# Patient Record
Sex: FEMALE | Race: BLACK OR AFRICAN AMERICAN | NOT HISPANIC OR LATINO | URBAN - METROPOLITAN AREA
[De-identification: names, ages, dates, MRNs, and addresses within clinical notes are randomized per-mention and may not be internally consistent; named-entity substitution may affect disease eponyms.]

---

## 2018-10-29 ENCOUNTER — INPATIENT (INPATIENT)
Facility: HOSPITAL | Age: 68
LOS: 6 days | Discharge: HOME HEALTH | DRG: 234 | End: 2018-11-05
Attending: THORACIC SURGERY (CARDIOTHORACIC VASCULAR SURGERY) | Admitting: THORACIC SURGERY (CARDIOTHORACIC VASCULAR SURGERY)
Payer: MEDICAID

## 2018-10-29 VITALS
RESPIRATION RATE: 17 BRPM | HEART RATE: 64 BPM | DIASTOLIC BLOOD PRESSURE: 73 MMHG | SYSTOLIC BLOOD PRESSURE: 155 MMHG | OXYGEN SATURATION: 100 % | WEIGHT: 132.72 LBS | TEMPERATURE: 99 F

## 2018-10-29 DIAGNOSIS — R07.9 CHEST PAIN, UNSPECIFIED: ICD-10-CM

## 2018-10-29 DIAGNOSIS — Z98.890 OTHER SPECIFIED POSTPROCEDURAL STATES: Chronic | ICD-10-CM

## 2018-10-29 DIAGNOSIS — I10 ESSENTIAL (PRIMARY) HYPERTENSION: ICD-10-CM

## 2018-10-29 DIAGNOSIS — Z98.51 TUBAL LIGATION STATUS: Chronic | ICD-10-CM

## 2018-10-29 DIAGNOSIS — E11.9 TYPE 2 DIABETES MELLITUS WITHOUT COMPLICATIONS: ICD-10-CM

## 2018-10-29 DIAGNOSIS — E78.5 HYPERLIPIDEMIA, UNSPECIFIED: ICD-10-CM

## 2018-10-29 DIAGNOSIS — N18.3 CHRONIC KIDNEY DISEASE, STAGE 3 (MODERATE): ICD-10-CM

## 2018-10-29 LAB
ALBUMIN SERPL ELPH-MCNC: 4.3 G/DL — SIGNIFICANT CHANGE UP (ref 3.3–5)
ALP SERPL-CCNC: 63 U/L — SIGNIFICANT CHANGE UP (ref 40–120)
ALT FLD-CCNC: 20 U/L — SIGNIFICANT CHANGE UP (ref 10–45)
ANION GAP SERPL CALC-SCNC: 17 MMOL/L — SIGNIFICANT CHANGE UP (ref 5–17)
APTT BLD: 27.1 SEC — LOW (ref 27.5–37.4)
AST SERPL-CCNC: 21 U/L — SIGNIFICANT CHANGE UP (ref 10–40)
BASOPHILS NFR BLD AUTO: 0.5 % — SIGNIFICANT CHANGE UP (ref 0–2)
BILIRUB SERPL-MCNC: 0.4 MG/DL — SIGNIFICANT CHANGE UP (ref 0.2–1.2)
BUN SERPL-MCNC: 33 MG/DL — HIGH (ref 7–23)
CALCIUM SERPL-MCNC: 10.2 MG/DL — SIGNIFICANT CHANGE UP (ref 8.4–10.5)
CHLORIDE SERPL-SCNC: 101 MMOL/L — SIGNIFICANT CHANGE UP (ref 96–108)
CK MB CFR SERPL CALC: 1.7 NG/ML — SIGNIFICANT CHANGE UP (ref 0–6.7)
CK SERPL-CCNC: 81 U/L — SIGNIFICANT CHANGE UP (ref 25–170)
CO2 SERPL-SCNC: 21 MMOL/L — LOW (ref 22–31)
CREAT SERPL-MCNC: 1.34 MG/DL — HIGH (ref 0.5–1.3)
EOSINOPHIL NFR BLD AUTO: 3.5 % — SIGNIFICANT CHANGE UP (ref 0–6)
EXTRA SST TUBE: SIGNIFICANT CHANGE UP
GLUCOSE BLDC GLUCOMTR-MCNC: 99 MG/DL — SIGNIFICANT CHANGE UP (ref 70–99)
GLUCOSE SERPL-MCNC: 231 MG/DL — HIGH (ref 70–99)
HCT VFR BLD CALC: 32.7 % — LOW (ref 34.5–45)
HGB BLD-MCNC: 10.7 G/DL — LOW (ref 11.5–15.5)
INR BLD: 1.03 — SIGNIFICANT CHANGE UP (ref 0.88–1.16)
LYMPHOCYTES # BLD AUTO: 36.4 % — SIGNIFICANT CHANGE UP (ref 13–44)
MCHC RBC-ENTMCNC: 26.8 PG — LOW (ref 27–34)
MCHC RBC-ENTMCNC: 32.7 G/DL — SIGNIFICANT CHANGE UP (ref 32–36)
MCV RBC AUTO: 82 FL — SIGNIFICANT CHANGE UP (ref 80–100)
MONOCYTES NFR BLD AUTO: 6.9 % — SIGNIFICANT CHANGE UP (ref 2–14)
NEUTROPHILS NFR BLD AUTO: 52.7 % — SIGNIFICANT CHANGE UP (ref 43–77)
PLATELET # BLD AUTO: 199 K/UL — SIGNIFICANT CHANGE UP (ref 150–400)
POTASSIUM SERPL-MCNC: 4.9 MMOL/L — SIGNIFICANT CHANGE UP (ref 3.5–5.3)
POTASSIUM SERPL-SCNC: 4.9 MMOL/L — SIGNIFICANT CHANGE UP (ref 3.5–5.3)
PROT SERPL-MCNC: 7.7 G/DL — SIGNIFICANT CHANGE UP (ref 6–8.3)
PROTHROM AB SERPL-ACNC: 11.4 SEC — SIGNIFICANT CHANGE UP (ref 9.8–12.7)
RBC # BLD: 3.99 M/UL — SIGNIFICANT CHANGE UP (ref 3.8–5.2)
RBC # FLD: 12.3 % — SIGNIFICANT CHANGE UP (ref 10.3–16.9)
SODIUM SERPL-SCNC: 139 MMOL/L — SIGNIFICANT CHANGE UP (ref 135–145)
TROPONIN T SERPL-MCNC: <0.01 NG/ML — SIGNIFICANT CHANGE UP (ref 0–0.01)
WBC # BLD: 7.7 K/UL — SIGNIFICANT CHANGE UP (ref 3.8–10.5)
WBC # FLD AUTO: 7.7 K/UL — SIGNIFICANT CHANGE UP (ref 3.8–10.5)

## 2018-10-29 PROCEDURE — 99285 EMERGENCY DEPT VISIT HI MDM: CPT | Mod: 25

## 2018-10-29 PROCEDURE — 75574 CT ANGIO HRT W/3D IMAGE: CPT | Mod: 26

## 2018-10-29 PROCEDURE — 71045 X-RAY EXAM CHEST 1 VIEW: CPT | Mod: 26

## 2018-10-29 RX ORDER — ASPIRIN/CALCIUM CARB/MAGNESIUM 324 MG
81 TABLET ORAL DAILY
Qty: 0 | Refills: 0 | Status: DISCONTINUED | OUTPATIENT
Start: 2018-10-29 | End: 2018-11-01

## 2018-10-29 RX ORDER — GLUCAGON INJECTION, SOLUTION 0.5 MG/.1ML
1 INJECTION, SOLUTION SUBCUTANEOUS ONCE
Qty: 0 | Refills: 0 | Status: DISCONTINUED | OUTPATIENT
Start: 2018-10-29 | End: 2018-10-31

## 2018-10-29 RX ORDER — ASPIRIN/CALCIUM CARB/MAGNESIUM 324 MG
325 TABLET ORAL ONCE
Qty: 0 | Refills: 0 | Status: COMPLETED | OUTPATIENT
Start: 2018-10-29 | End: 2018-10-29

## 2018-10-29 RX ORDER — ATORVASTATIN CALCIUM 80 MG/1
20 TABLET, FILM COATED ORAL AT BEDTIME
Qty: 0 | Refills: 0 | Status: DISCONTINUED | OUTPATIENT
Start: 2018-10-29 | End: 2018-10-31

## 2018-10-29 RX ORDER — DEXTROSE 50 % IN WATER 50 %
15 SYRINGE (ML) INTRAVENOUS ONCE
Qty: 0 | Refills: 0 | Status: DISCONTINUED | OUTPATIENT
Start: 2018-10-29 | End: 2018-10-31

## 2018-10-29 RX ORDER — SODIUM CHLORIDE 9 MG/ML
1000 INJECTION, SOLUTION INTRAVENOUS
Qty: 0 | Refills: 0 | Status: DISCONTINUED | OUTPATIENT
Start: 2018-10-29 | End: 2018-11-01

## 2018-10-29 RX ORDER — HEPARIN SODIUM 5000 [USP'U]/ML
5000 INJECTION INTRAVENOUS; SUBCUTANEOUS EVERY 8 HOURS
Qty: 0 | Refills: 0 | Status: DISCONTINUED | OUTPATIENT
Start: 2018-10-29 | End: 2018-10-31

## 2018-10-29 RX ORDER — SODIUM CHLORIDE 9 MG/ML
1000 INJECTION INTRAMUSCULAR; INTRAVENOUS; SUBCUTANEOUS ONCE
Qty: 0 | Refills: 0 | Status: COMPLETED | OUTPATIENT
Start: 2018-10-29 | End: 2018-10-29

## 2018-10-29 RX ORDER — INSULIN LISPRO 100/ML
VIAL (ML) SUBCUTANEOUS
Qty: 0 | Refills: 0 | Status: DISCONTINUED | OUTPATIENT
Start: 2018-10-29 | End: 2018-11-01

## 2018-10-29 RX ORDER — METOPROLOL TARTRATE 50 MG
50 TABLET ORAL ONCE
Qty: 0 | Refills: 0 | Status: COMPLETED | OUTPATIENT
Start: 2018-10-29 | End: 2018-10-29

## 2018-10-29 RX ORDER — METOPROLOL TARTRATE 50 MG
1 TABLET ORAL
Qty: 0 | Refills: 0 | COMMUNITY

## 2018-10-29 RX ORDER — FAMOTIDINE 10 MG/ML
20 INJECTION INTRAVENOUS ONCE
Qty: 0 | Refills: 0 | Status: COMPLETED | OUTPATIENT
Start: 2018-10-29 | End: 2018-10-29

## 2018-10-29 RX ORDER — LIDOCAINE 4 G/100G
10 CREAM TOPICAL ONCE
Qty: 0 | Refills: 0 | Status: COMPLETED | OUTPATIENT
Start: 2018-10-29 | End: 2018-10-29

## 2018-10-29 RX ORDER — DEXTROSE 50 % IN WATER 50 %
25 SYRINGE (ML) INTRAVENOUS ONCE
Qty: 0 | Refills: 0 | Status: DISCONTINUED | OUTPATIENT
Start: 2018-10-29 | End: 2018-10-31

## 2018-10-29 RX ORDER — ONDANSETRON 8 MG/1
4 TABLET, FILM COATED ORAL ONCE
Qty: 0 | Refills: 0 | Status: COMPLETED | OUTPATIENT
Start: 2018-10-29 | End: 2018-10-29

## 2018-10-29 RX ORDER — METOPROLOL TARTRATE 50 MG
25 TABLET ORAL DAILY
Qty: 0 | Refills: 0 | Status: DISCONTINUED | OUTPATIENT
Start: 2018-10-29 | End: 2018-10-31

## 2018-10-29 RX ORDER — DEXTROSE 50 % IN WATER 50 %
12.5 SYRINGE (ML) INTRAVENOUS ONCE
Qty: 0 | Refills: 0 | Status: DISCONTINUED | OUTPATIENT
Start: 2018-10-29 | End: 2018-10-31

## 2018-10-29 RX ADMIN — ONDANSETRON 104 MILLIGRAM(S): 8 TABLET, FILM COATED ORAL at 14:11

## 2018-10-29 RX ADMIN — Medication 325 MILLIGRAM(S): at 13:43

## 2018-10-29 RX ADMIN — Medication 30 MILLILITER(S): at 14:07

## 2018-10-29 RX ADMIN — ATORVASTATIN CALCIUM 20 MILLIGRAM(S): 80 TABLET, FILM COATED ORAL at 23:26

## 2018-10-29 RX ADMIN — SODIUM CHLORIDE 2000 MILLILITER(S): 9 INJECTION INTRAMUSCULAR; INTRAVENOUS; SUBCUTANEOUS at 15:56

## 2018-10-29 RX ADMIN — FAMOTIDINE 20 MILLIGRAM(S): 10 INJECTION INTRAVENOUS at 14:11

## 2018-10-29 RX ADMIN — LIDOCAINE 10 MILLILITER(S): 4 CREAM TOPICAL at 14:10

## 2018-10-29 RX ADMIN — Medication 50 MILLIGRAM(S): at 14:13

## 2018-10-29 RX ADMIN — HEPARIN SODIUM 5000 UNIT(S): 5000 INJECTION INTRAVENOUS; SUBCUTANEOUS at 23:26

## 2018-10-29 RX ADMIN — SODIUM CHLORIDE 2000 MILLILITER(S): 9 INJECTION INTRAMUSCULAR; INTRAVENOUS; SUBCUTANEOUS at 14:13

## 2018-10-29 NOTE — H&P ADULT - NSHPLABSRESULTS_GEN_ALL_CORE
EKG: no ischemic changes                           10.7   7.7   )-----------( 199      ( 29 Oct 2018 12:45 )             32.7       10-29    139  |  101  |  33<H>  ----------------------------<  231<H>  4.9   |  21<L>  |  1.34<H>    Ca    10.2      29 Oct 2018 12:45    TPro  7.7  /  Alb  4.3  /  TBili  0.4  /  DBili  x   /  AST  21  /  ALT  20  /  AlkPhos  63  10-29      PT/INR - ( 29 Oct 2018 12:45 )   PT: 11.4 sec;   INR: 1.03          PTT - ( 29 Oct 2018 12:45 )  PTT:27.1 sec    CARDIAC MARKERS ( 29 Oct 2018 12:45 )  x     / 0.02 ng/mL / 86 U/L / x     / 1.8 ng/mL

## 2018-10-29 NOTE — H&P ADULT - HISTORY OF PRESENT ILLNESS
Patient is a 69 y/o female with PMHx of HTN, HLD, DM II, CKD stage 3 (unknown baseline Cr) who presented to [facility] ED on 10/29/2018 c/o     Pt denies CP, SOB, palpitations, syncope, PND/orthopnea or LE edema    In ED VS /73, HR 64, RR 17, O2 100%, Temp 98.9. EKG no ischemic changes. CXR unremarkable Labs Trop x1 0.02, Cr. 1.3 CTA coronaries Ca score moderately elevated at 366, severe stenosis due to calcific and noncalcific plaque pLAD, D1, mLCx, mRCA and dRCA. In ED pt given IVF NS bolus x2, Lidocaine 2% 10mL POx1, Lopressor 50mg POx1, IV Zofran 4mg x1, Pepcid 20mg POx1, ASA 325mg POx1 and Maalox 30mL POx1    Upon examination pt currently CP free    Pt is now admitted to [facility] to r/o ACS Patient is a 69 y/o female (visiting from [facility]) with PMHx of HTN, HLD, DM II, CKD stage 3 (unknown baseline Cr) who presented to [facility] ED on 10/29/2018 c/o stabbing chest pain (8/10 severity) that radiates to left shoulder that started last night which is associated with SOB and nausea. Patient states she has had intermittent episodes of chest pain for the past 6 months, but this episode was much worse than what she had experienced in previous episodes    Pt denies CP, SOB, palpitations, syncope, PND/orthopnea or LE edema    In ED VS /73, HR 64, RR 17, O2 100%, Temp 98.9. EKG no ischemic changes. CXR unremarkable Labs Trop x1 0.02, Cr. 1.3 CTA coronaries Ca score moderately elevated at 366, severe stenosis due to calcific and noncalcific plaque pLAD, D1, mLCx, mRCA and dRCA. In ED pt given IVF NS bolus x2, Lidocaine 2% 10mL POx1, Lopressor 50mg POx1, IV Zofran 4mg x1, Pepcid 20mg POx1, ASA 325mg POx1 and Maalox 30mL POx1    Upon examination pt currently CP free    Pt is now admitted to [facility] to r/o ACS Patient is a 67 y/o female (visiting from [facility]) with FHx of CAD PMHx of hx of silent MI (medically managed), HTN, HLD, DM II, CKD stage 3 (unknown baseline Cr) who presented to [facility] ED on 10/29/2018 c/o stabbing chest pain (8/10 severity) that radiates to left shoulder that started last night which is associated with SOB and nausea. Patient states she has had intermittent episodes of chest pain for the past 6 months, but this episode was much worse than what she had experienced in previous episodes. In addition pt reports that for the last year she has experienced JAFFE upon ambulating 5-10 minutes that is subsequently resolved with rest. Pt denies palpitations, syncope, PND/orthopnea or LE edema, recent illness or trauma to chest, fever or chills. In ED VS /73, HR 64, RR 17, O2 100%, Temp 98.9. EKG no ischemic changes. CXR unremarkable Labs Trop x1 0.02, Cr. 1.3 CTA coronaries Ca score moderately elevated at 366, severe stenosis due to calcific and noncalcific plaque pLAD, D1, mLCx, mRCA and dRCA. In ED pt given IVF NS bolus x2, Lidocaine 2% 10mL POx1, Lopressor 50mg POx1, IV Zofran 4mg x1, Pepcid 20mg POx1, ASA 325mg POx1 and Maalox 30mL POx1. Upon examination pt currently CP free.     Pt is now admitted to [facility] for serial Andres, ECHO, and further cardiac workup to r/o ACS Patient is a 69 y/o female (visiting from [facility]) with FHx of CAD PMHx of silent MI (medically managed), HTN, HLD, DM II, CKD stage 3 (unknown baseline Cr) who presented to [facility] ED on 10/29/2018 c/o stabbing chest pain (8/10 severity) that radiates to left shoulder that started last night which is associated with SOB and nausea. Patient states she has had intermittent episodes of chest pain for the past 6 months, but this episode was much worse than what she had experienced in previous episodes. In addition pt reports that for the last year she has experienced JAFFE upon ambulating 5-10 minutes that is subsequently resolved with rest. Pt denies palpitations, syncope, PND/orthopnea or LE edema, recent illness or trauma to chest, fever or chills. In ED VS /73, HR 64, RR 17, O2 100%, Temp 98.9. EKG no ischemic changes. CXR unremarkable Labs Trop x1 0.02, Cr. 1.3 CTA coronaries Ca score moderately elevated at 366, severe stenosis due to calcific and noncalcific plaque pLAD, D1, mLCx, mRCA and dRCA. In ED pt given IVF NS bolus x2, Lidocaine 2% 10mL POx1, Lopressor 50mg POx1, IV Zofran 4mg x1, Pepcid 20mg POx1, ASA 325mg POx1 and Maalox 30mL POx1. Upon examination pt currently CP free.     Pt is now admitted to [facility] for serial Andres, ECHO, and further cardiac workup to r/o ACS

## 2018-10-29 NOTE — H&P ADULT - PROBLEM SELECTOR PLAN 5
-Cr 1.3 on admission, unknown baseline  -hydrate pre-cath in AM  -monitor Cr    DVT ppx:  Dispo: NPO for cath in AM    Case d/w Dr. Tena -Cr 1.3 on admission, unknown baseline  -hydrate pre-cath in AM  -monitor Cr    DVT ppx: SQH  Dispo: NPO for cath in AM    Case d/w Dr. Tena

## 2018-10-29 NOTE — ED PROVIDER NOTE - MEDICAL DECISION MAKING DETAILS
69 y/o female (visiting from [facility], came to the [place] a couple of [place] ago) with PMHx of HTN, HLD, DM II, no known CAD, p/w sharp chest pains radiating to left shoulder and back that started last night and is associated with SOB and nausea. Patient states she has had intermittent episodes of chest pain for the past 6 months. In ED, VS noted and EKG with no ischemic changes. CXR unremarkable. Labs Trop x1 0.02, Cr. 1.3 CTA coronaries Ca score moderately elevated at 366, severe stenosis due to calcific and noncalcific plaque pLAD, D1, mLCx, mRCA and dRCA. Pt given IVF NS bolus x2, Lidocaine 2% 10mL POx1, Lopressor 50mg POx1, IV Zofran 4mg x1, Pepcid 20mg POx1, ASA 325mg POx1 and Maalox 30mL POx1. Case discussed with Dr. Tena and pt admitted to cardiac/ tele sec to sxs and abnormal CTA findings.

## 2018-10-29 NOTE — PROGRESS NOTE ADULT - ASSESSMENT
Chest pain   Monitor  CIE      for echo    Plan    r/o ACS   CTA  Coronaries in AM     Discussed with Dr Eulogio Galloway

## 2018-10-29 NOTE — PROGRESS NOTE ADULT - SUBJECTIVE AND OBJECTIVE BOX
Pt is s/p Hx Diabetes mellitus who present with new onset of Chest pain since last night     PAST MEDICAL & SURGICAL HISTORY:  HTN (hypertension)  DM (diabetes mellitus)    MEDICATIONS  (STANDING):    MEDICATIONS  (PRN):    Home Medications:  Vital Signs Last 24 Hrs  T(C): 37.1 (29 Oct 2018 16:00), Max: 37.2 (29 Oct 2018 12:10)  T(F): 98.7 (29 Oct 2018 16:00), Max: 98.9 (29 Oct 2018 12:10)  HR: 57 (29 Oct 2018 16:00) (57 - 64)  BP: 160/72 (29 Oct 2018 16:00) (155/73 - 160/72)  BP(mean): --  RR: 18 (29 Oct 2018 16:00) (17 - 18)  SpO2: 98% (29 Oct 2018 16:00) (98% - 100%)      Lungs clear  CV s1 s2   abd soft  Ext stable    10-29    139  |  101  |  33<H>  ----------------------------<  231<H>  4.9   |  21<L>  |  1.34<H>    Ca    10.2      29 Oct 2018 12:45    TPro  7.7  /  Alb  4.3  /  TBili  0.4  /  DBili  x   /  AST  21  /  ALT  20  /  AlkPhos  63  10-29                          10.7   7.7   )-----------( 199      ( 29 Oct 2018 12:45 )             32.7       CARDIAC MARKERS ( 29 Oct 2018 12:45 )  x     / 0.02 ng/mL / 86 U/L / x     / 1.8 ng/mL    EKG   NSR  ST depression  I II [id number] T inversion   old AS MI       Chest  C xray Neg     Creatine Kinase, Serum: 86 U/L (10.29.18 @ 12:45)

## 2018-10-29 NOTE — ED PROVIDER NOTE - OBJECTIVE STATEMENT
67 y/o female (visiting from [facility], came to the [place] a couple of [place] ago) with PMHx of HTN, HLD, DM II, no known CAD, p/w sharp chest pains radiating to left shoulder and back that started last night and is associated with SOB and nausea. Patient states she has had intermittent episodes of chest pain for the past 6 months, but this episode was much worse. Pain is not associated with activity. No trauma/ injury. Currently with "little" chest pain. Pt denies syncope, palpitations, PND/orthopnea or LE edema, swelling or pain.

## 2018-10-29 NOTE — H&P ADULT - PROBLEM SELECTOR PLAN 1
-CP free, EKG with no ischemic changes, Trop x1 0.02 f/u trops @8pm and in AM  -CTA coronaries 10/29: Ca score moderately elevated at 366, severe stenosis due to calcific and noncalcific plaque pLAD, D1, mLCx, mRCA and dRCA  -Given ASA 325mg POx1 in ED  -NPO for cardiac cath in AM will need to be put on schedule, consented, and loaded  -ECHO ordered f/u

## 2018-10-29 NOTE — H&P ADULT - ASSESSMENT
Patient is a 67 y/o female with PMHx of HTN, HLD, DM II, CKD stage 3 (unknown baseline Cr) who is admitted to [facility] to r/o ACS

## 2018-10-29 NOTE — ED PROVIDER NOTE - PROGRESS NOTE DETAILS
In ED, VS noted and EKG with no ischemic changes. CXR unremarkable Labs Trop x1 0.02, Cr. 1.3 CTA coronaries Ca score moderately elevated at 366, severe stenosis due to calcific and noncalcific plaque pLAD, D1, mLCx, mRCA and dRCA. Pt given IVF NS bolus x2, Lidocaine 2% 10mL POx1, Lopressor 50mg POx1, IV Zofran 4mg x1, Pepcid 20mg POx1, ASA 325mg POx1 and Maalox 30mL POx1

## 2018-10-29 NOTE — H&P ADULT - PMH
CKD (chronic kidney disease) stage 3, GFR 30-59 ml/min    DM (diabetes mellitus)    HLD (hyperlipidemia)    HTN (hypertension)

## 2018-10-30 LAB
ANION GAP SERPL CALC-SCNC: 12 MMOL/L — SIGNIFICANT CHANGE UP (ref 5–17)
BUN SERPL-MCNC: 20 MG/DL — SIGNIFICANT CHANGE UP (ref 7–23)
CALCIUM SERPL-MCNC: 10.2 MG/DL — SIGNIFICANT CHANGE UP (ref 8.4–10.5)
CHLORIDE SERPL-SCNC: 104 MMOL/L — SIGNIFICANT CHANGE UP (ref 96–108)
CHOLEST SERPL-MCNC: 134 MG/DL — SIGNIFICANT CHANGE UP (ref 10–199)
CK MB CFR SERPL CALC: 2 NG/ML — SIGNIFICANT CHANGE UP (ref 0–6.7)
CK SERPL-CCNC: 76 U/L — SIGNIFICANT CHANGE UP (ref 25–170)
CO2 SERPL-SCNC: 24 MMOL/L — SIGNIFICANT CHANGE UP (ref 22–31)
CREAT SERPL-MCNC: 1.29 MG/DL — SIGNIFICANT CHANGE UP (ref 0.5–1.3)
GLUCOSE BLDC GLUCOMTR-MCNC: 110 MG/DL — HIGH (ref 70–99)
GLUCOSE BLDC GLUCOMTR-MCNC: 214 MG/DL — HIGH (ref 70–99)
GLUCOSE BLDC GLUCOMTR-MCNC: 93 MG/DL — SIGNIFICANT CHANGE UP (ref 70–99)
GLUCOSE BLDC GLUCOMTR-MCNC: 95 MG/DL — SIGNIFICANT CHANGE UP (ref 70–99)
GLUCOSE SERPL-MCNC: 86 MG/DL — SIGNIFICANT CHANGE UP (ref 70–99)
HBA1C BLD-MCNC: 8.3 % — HIGH (ref 4–5.6)
HCT VFR BLD CALC: 29.8 % — LOW (ref 34.5–45)
HDLC SERPL-MCNC: 32 MG/DL — LOW
HGB BLD-MCNC: 9.9 G/DL — LOW (ref 11.5–15.5)
LIPID PNL WITH DIRECT LDL SERPL: 75 MG/DL — SIGNIFICANT CHANGE UP
MAGNESIUM SERPL-MCNC: 1.6 MG/DL — SIGNIFICANT CHANGE UP (ref 1.6–2.6)
MCHC RBC-ENTMCNC: 27.3 PG — SIGNIFICANT CHANGE UP (ref 27–34)
MCHC RBC-ENTMCNC: 33.2 G/DL — SIGNIFICANT CHANGE UP (ref 32–36)
MCV RBC AUTO: 82.3 FL — SIGNIFICANT CHANGE UP (ref 80–100)
PLATELET # BLD AUTO: 177 K/UL — SIGNIFICANT CHANGE UP (ref 150–400)
POTASSIUM SERPL-MCNC: 4.2 MMOL/L — SIGNIFICANT CHANGE UP (ref 3.5–5.3)
POTASSIUM SERPL-SCNC: 4.2 MMOL/L — SIGNIFICANT CHANGE UP (ref 3.5–5.3)
RBC # BLD: 3.62 M/UL — LOW (ref 3.8–5.2)
RBC # FLD: 12.3 % — SIGNIFICANT CHANGE UP (ref 10.3–16.9)
SODIUM SERPL-SCNC: 140 MMOL/L — SIGNIFICANT CHANGE UP (ref 135–145)
TOTAL CHOLESTEROL/HDL RATIO MEASUREMENT: 4.2 RATIO — SIGNIFICANT CHANGE UP (ref 3.3–7.1)
TRIGL SERPL-MCNC: 137 MG/DL — SIGNIFICANT CHANGE UP (ref 10–149)
TROPONIN T SERPL-MCNC: 0.03 NG/ML — HIGH (ref 0–0.01)
TROPONIN T SERPL-MCNC: 0.03 NG/ML — HIGH (ref 0–0.01)
TSH SERPL-MCNC: 1.87 UIU/ML — SIGNIFICANT CHANGE UP (ref 0.35–4.94)
WBC # BLD: 6.2 K/UL — SIGNIFICANT CHANGE UP (ref 3.8–10.5)
WBC # FLD AUTO: 6.2 K/UL — SIGNIFICANT CHANGE UP (ref 3.8–10.5)

## 2018-10-30 PROCEDURE — 93458 L HRT ARTERY/VENTRICLE ANGIO: CPT | Mod: 26,XU

## 2018-10-30 PROCEDURE — 99223 1ST HOSP IP/OBS HIGH 75: CPT | Mod: 57

## 2018-10-30 RX ORDER — ASPIRIN/CALCIUM CARB/MAGNESIUM 324 MG
81 TABLET ORAL ONCE
Qty: 0 | Refills: 0 | Status: COMPLETED | OUTPATIENT
Start: 2018-10-30 | End: 2018-10-30

## 2018-10-30 RX ORDER — INFLUENZA VIRUS VACCINE 15; 15; 15; 15 UG/.5ML; UG/.5ML; UG/.5ML; UG/.5ML
0.5 SUSPENSION INTRAMUSCULAR ONCE
Qty: 0 | Refills: 0 | Status: DISCONTINUED | OUTPATIENT
Start: 2018-10-30 | End: 2018-11-01

## 2018-10-30 RX ORDER — MAGNESIUM SULFATE 500 MG/ML
2 VIAL (ML) INJECTION ONCE
Qty: 0 | Refills: 0 | Status: COMPLETED | OUTPATIENT
Start: 2018-10-30 | End: 2018-10-30

## 2018-10-30 RX ORDER — CLOPIDOGREL BISULFATE 75 MG/1
600 TABLET, FILM COATED ORAL ONCE
Qty: 0 | Refills: 0 | Status: COMPLETED | OUTPATIENT
Start: 2018-10-30 | End: 2018-10-30

## 2018-10-30 RX ORDER — SODIUM CHLORIDE 9 MG/ML
500 INJECTION INTRAMUSCULAR; INTRAVENOUS; SUBCUTANEOUS
Qty: 0 | Refills: 0 | Status: DISCONTINUED | OUTPATIENT
Start: 2018-10-30 | End: 2018-10-31

## 2018-10-30 RX ADMIN — Medication 81 MILLIGRAM(S): at 11:39

## 2018-10-30 RX ADMIN — HEPARIN SODIUM 5000 UNIT(S): 5000 INJECTION INTRAVENOUS; SUBCUTANEOUS at 05:20

## 2018-10-30 RX ADMIN — HEPARIN SODIUM 5000 UNIT(S): 5000 INJECTION INTRAVENOUS; SUBCUTANEOUS at 13:47

## 2018-10-30 RX ADMIN — HEPARIN SODIUM 5000 UNIT(S): 5000 INJECTION INTRAVENOUS; SUBCUTANEOUS at 21:51

## 2018-10-30 RX ADMIN — SODIUM CHLORIDE 75 MILLILITER(S): 9 INJECTION INTRAMUSCULAR; INTRAVENOUS; SUBCUTANEOUS at 17:22

## 2018-10-30 RX ADMIN — Medication 50 GRAM(S): at 11:38

## 2018-10-30 RX ADMIN — CLOPIDOGREL BISULFATE 600 MILLIGRAM(S): 75 TABLET, FILM COATED ORAL at 11:39

## 2018-10-30 RX ADMIN — Medication 25 MILLIGRAM(S): at 05:20

## 2018-10-30 RX ADMIN — Medication 0: at 17:21

## 2018-10-30 RX ADMIN — ATORVASTATIN CALCIUM 20 MILLIGRAM(S): 80 TABLET, FILM COATED ORAL at 21:51

## 2018-10-30 NOTE — CONSULT NOTE ADULT - SUBJECTIVE AND OBJECTIVE BOX
HPI: 67 yo Female with history of type 2 DM presenting for chest pain, now awaiting cardiac catheterization for further management.   She reports being diagnosed with type 2 DM 17 years ago, was on insulin 3 years ago for about 2 years, but had many hypoglycemic episodes and it was stopped.   She is now just taking metformin.   She checks her FSG once daily in the morning at home and reports that the readings are highly variable, but never less than 70.   She denies hx of admissions for HHS or DKA,   She reports non compliance at home with a diabetic diet and does not engage in exercise.   She report drinking juice at home.   She reports being diagnosed with eye problems related to her diabetes, endorses symptoms of peripheral neuropathy in her feet, and gastroporesis with frequent nasuea and occasional vomiting after eating.   She reports waking up multiple times nightly to urinate and occasional excessive thirst.   She endorses occasional headaches and dizziness  denies problems with constipation or diarrhea,   no SOB at rest.     She does not follow with an endocrinologist.     PMH:  HLD (hyperlipidemia)  CKD (chronic kidney disease) stage 3, GFR 30-59 ml/min  HTN (hypertension)  DM (diabetes mellitus)  H/O eye surgery  H/O tubal ligation    FH:  DM: yes in parents and siblings  Thyroid:denies  Autoimmune: denies  Other: denies    SH:  Smoking: never smoker  Etoh: no etoh  Recreational Drugs: none  Social Life:reports lives alone, has 9 children.     Current Meds:  aspirin enteric coated 81 milliGRAM(s) Oral daily  atorvastatin 20 milliGRAM(s) Oral at bedtime  dextrose 40% Gel 15 Gram(s) Oral once PRN  dextrose 5%. 1000 milliLiter(s) IV Continuous <Continuous>  dextrose 50% Injectable 12.5 Gram(s) IV Push once  dextrose 50% Injectable 25 Gram(s) IV Push once  dextrose 50% Injectable 25 Gram(s) IV Push once  glucagon  Injectable 1 milliGRAM(s) IntraMuscular once PRN  heparin  Injectable 5000 Unit(s) SubCutaneous every 8 hours  insulin lispro (HumaLOG) corrective regimen sliding scale   SubCutaneous Before meals and at bedtime  metoprolol succinate ER 25 milliGRAM(s) Oral daily      Allergies:  No Known Allergies      ROS:  Denies the following except as indicated.    General: weight loss/weight gain, decreased appetite, fatigue  Eyes: Blurry vision, double vision, visual changes  ENT: Throat pain, changes in voice,   CV: palpitations, SOB, CP, cough  GI: NVD, difficulty swallowing, abdominal pain  : polyuria, dysuria  Endo: abnormal menses, temperature intolerance, decreased libido  MSK: weakness, joint pain  Skin: rash, dryness, diaphoresis  Heme: Easy bruising,bleeding  Neuro: HA, dizziness, lightheadedness, numbness tingling  Psych: Anxiety, Depression    Vital Signs Last 24 Hrs  T(C): 36.8 (30 Oct 2018 09:10), Max: 37.2 (29 Oct 2018 18:33)  T(F): 98.2 (30 Oct 2018 09:10), Max: 98.9 (29 Oct 2018 18:33)  HR: 63 (30 Oct 2018 09:10) (57 - 64)  BP: 154/78 (30 Oct 2018 09:10) (154/78 - 160/72)  BP(mean): 100 (29 Oct 2018 18:33) (100 - 100)  RR: 16 (30 Oct 2018 09:10) (16 - 18)  SpO2: 97% (30 Oct 2018 09:10) (96% - 100%)    Weight (kg): 60.2 (10-29 @ 18:33)    Constitutional: wn/wd in NAD.   HEENT: NCAT, MMM, OP clear, EOMI, , no proptosis or lid retraction  Neck: no thyromegaly or palpable thyroid nodules   Respiratory: poor air movement   Cardiovascular: regular rhythm, normal S1 and S2, no audible murmurs, no peripheral edema  GI: soft, NT/ND, no masses/HSM appreciated.  Neurology: no tremors, DTR 2+  Skin: no visible rashes/lesions  Psychiatric: AAO x 3, normal affect/mood.  Ext: radial pulses intact, DP pulses intact, extremities warm, no cyanosis, clubbing or edema.       LABS:                        9.9    6.2   )-----------( 177      ( 30 Oct 2018 05:51 )             29.8     10-30    140  |  104  |  20  ----------------------------<  86  4.2   |  24  |  1.29    Ca    10.2      30 Oct 2018 05:51  Mg     1.6     10-30    TPro  7.7  /  Alb  4.3  /  TBili  0.4  /  DBili  x   /  AST  21  /  ALT  20  /  AlkPhos  63  10-29    PT/INR - ( 29 Oct 2018 12:45 )   PT: 11.4 sec;   INR: 1.03          PTT - ( 29 Oct 2018 12:45 )  PTT:27.1 sec    Hemoglobin A1C, Whole Blood: 8.3 (10-30 @ 05:51)    Thyroid Stimulating Hormone, Serum: 1.866 (10-30 @ 05:51)    CAPILLARY BLOOD GLUCOSE    POCT Blood Glucose.: 110 mg/dL (30 Oct 2018 11:40)  POCT Blood Glucose.: 93 mg/dL (30 Oct 2018 06:23)  POCT Blood Glucose.: 99 mg/dL (29 Oct 2018 23:17)

## 2018-10-30 NOTE — CONSULT NOTE ADULT - SUBJECTIVE AND OBJECTIVE BOX
Surgeon: Dr. Burnett     Requesting Physician: Dr. Berumen     HISTORY OF PRESENT ILLNESS:  68 year old female (visiting from [facility]) with FHx of CAD and PMHx of MI medically managed 7 months ago, HTN, HLD, DM, CKD stage 3 (baseline Cr unknown), presented to [facility] ED 10/29 complaining of 8/10 stabbing chest pain that radiated to her left shoulder x 1 day, associated with nausea and SOB. Patient states that since her MI 7 months ago she has had intermittent chest pain at rest and on exertion. In the ED, CTA coronaries revealed moderately elevated calcium score with severe stenosis due to calcific and noncalcific plaque pLAD, D1, mLCx, mRCA and dRCA. She was admitted to cardiology for further workup and underwent cardiac cath today revealing 3VD: 50% oLM lesion, 90-95% pLAD lesion, 90% D1 lesion, 80% pLCx lesion, 90% OM1 lesion, 100% mRCA lesion with L-R collaterals, EF:55%, CT surgery consulted for surgical evaluation. Currently patient is chest pain free and denies any shortness of breath, palpitations, abdominal pain, nasuea, vomiting, recent illness.     PAST MEDICAL & SURGICAL HISTORY:  HLD (hyperlipidemia)  CKD (chronic kidney disease) stage 3, GFR 30-59 ml/min  HTN (hypertension)  DM (diabetes mellitus)  H/O eye surgery  H/O tubal ligation      MEDICATIONS  (STANDING):  aspirin enteric coated 81 milliGRAM(s) Oral daily  atorvastatin 20 milliGRAM(s) Oral at bedtime  heparin  Injectable 5000 Unit(s) SubCutaneous every 8 hours  influenza   Vaccine 0.5 milliLiter(s) IntraMuscular once  insulin lispro (HumaLOG) corrective regimen sliding scale   SubCutaneous Before meals and at bedtime  metoprolol succinate ER 25 milliGRAM(s) Oral daily  sodium chloride 0.9%. 500 milliLiter(s) (75 mL/Hr) IV Continuous <Continuous>    MEDICATIONS  (PRN):  dextrose 40% Gel 15 Gram(s) Oral once PRN Blood Glucose LESS THAN 70 milliGRAM(s)/deciliter  glucagon  Injectable 1 milliGRAM(s) IntraMuscular once PRN Glucose LESS THAN 70 milligrams/deciliter      Allergies    No Known Allergies    Intolerances    SOCIAL HISTORY:  Smoker:  No  ETOH use: No  Ilicit Drug use: No    FAMILY HISTORY:  Family history of coronary artery disease (Father)    Review of Systems  CONSTITUTIONAL:  Denies Fevers / chills, sweats, fatigue, weight loss, weight gain                                      NEURO:  Denies parathesias, seizures, syncope, confusion                                                                                EYES:  Denies Blurry vision, discharge, pain, loss of vision                                                                                    ENMT:  Denies Difficulty hearing, vertigo, dysphagia, epistaxis, recent dental work                                       CV:  Denies Chest pain, palpitations, JAFFE, orthopnea                                                                                          RESPIRATORY:  Denies Wheezing, SOB, cough / sputum, hemoptysis                                                                GI:  Denies Nausea, vommiting, diarrhea, constipation, melena, difficulty swallowing                                               : Denies Hematuria, dysuria, urgency, incontinence                                                                                         MUSKULOSKELETAL:  Denies arthritis, joint swelling, muscle weakness                                                             SKIN/BREAST:  Denies rash, itching, kandy loss, masses                                                                                            PSYCH:  Denies depresion, anxiety, suicidal ideation                                                                                               HEME/LYMPH:  Denies bruises easily, enlarged lymph nodes, tender lymph nodes                                        ENDOCRINE:  Denies cold intolerance, heat intolerance, polydipsia     PHYSICAL EXAM  Vital Signs Last 24 Hrs  T(C): 36.2 (30 Oct 2018 18:20), Max: 36.8 (29 Oct 2018 22:59)  T(F): 97.1 (30 Oct 2018 18:20), Max: 98.2 (29 Oct 2018 22:59)  HR: 61 (30 Oct 2018 18:20) (57 - 63)  BP: 185/76 (30 Oct 2018 18:20) (137/65 - 185/76)  BP(mean): --  RR: 18 (30 Oct 2018 18:20) (16 - 18)  SpO2: 100% (30 Oct 2018 18:20) (96% - 100%)    CONSTITUTIONAL:  Patient lying comfortably in bed, no acute distress   NEURO: Alert and oriented. No focal neurological deficits       EYES: PERRL, WNL   ENMT:  WNL   CV: S1S2, RRR, no murmurs appreciated on exam   RESPIRATORY:  Clear to ausculation bilaterally   GI:  Abdomen soft, non tender, non distended   : No meyer   MUSKULOSKELETAL: CM, ROM intact   SKIN / BREAST:  No rashes noted   Extremities: warm and well perfused. No edema or calf tenderness   Vascular: peripheral pulses 2+ bilaterally                                                           LABS:                        9.9    6.2   )-----------( 177      ( 30 Oct 2018 05:51 )             29.8     10-30    140  |  104  |  20  ----------------------------<  86  4.2   |  24  |  1.29    Ca    10.2      30 Oct 2018 05:51  Mg     1.6     10-30    TPro  7.7  /  Alb  4.3  /  TBili  0.4  /  DBili  x   /  AST  21  /  ALT  20  /  AlkPhos  63  10-29    PT/INR - ( 29 Oct 2018 12:45 )   PT: 11.4 sec;   INR: 1.03          PTT - ( 29 Oct 2018 12:45 )  PTT:27.1 sec    CARDIAC MARKERS ( 30 Oct 2018 11:02 )  x     / 0.03 ng/mL / x     / x     / x      CARDIAC MARKERS ( 30 Oct 2018 05:51 )  x     / 0.03 ng/mL / 76 U/L / x     / 2.0 ng/mL  CARDIAC MARKERS ( 29 Oct 2018 19:43 )  x     / <0.01 ng/mL / 81 U/L / x     / 1.7 ng/mL  CARDIAC MARKERS ( 29 Oct 2018 12:45 )  x     / 0.02 ng/mL / 86 U/L / x     / 1.8 ng/mL        Hemoglobin A1C, Whole Blood: 8.3 % (10-30 @ 05:51)  Thyroid Stimulating Hormone, Serum: 1.866 uIU/mL (10-30 @ 05:51)    RADIOLOGY & ADDITIONAL STUDIES:  CXR: < from: Xray Chest 1 View-PORTABLE IMMEDIATE (10.29.18 @ 15:44) >  Support Devices: None  Heart:  Unremarkable  Mediastinum:  Unremarkable  Lungs/Airways:  Unremarkable  Bones/Soft tissues: Unremarkable    < end of copied text >    CT Scan: < from: CT Angio Cardiac w/ IV Cont (10.29.18 @ 16:17) >  Mild small airway inflammation most pronounced within the right middle   lobe with some mild air trapping.    < end of copied text >    EKG: Pending     TTE / DANILO: Pending     Cardiac Cath: 3VD: 50% oLM lesion, 90-95% pLAD lesion, 90% D1 lesion, 80% pLCx lesion, 90% OM1 lesion, 100% mRCA lesion with L-R collaterals, EF:55%,

## 2018-10-30 NOTE — PROGRESS NOTE ADULT - SUBJECTIVE AND OBJECTIVE BOX
Pt is s/p Chest pain  s/p Coronary CTA    Coronary CTA revealed small airway inflammation   Calcium score   366      severe   stenosis   prox LAD  and Mid LAD  L Cx 150  multiple plaques  L Circ system   RCA 82   severe RCA stenosis    PAST MEDICAL & SURGICAL HISTORY:  HLD (hyperlipidemia)  CKD (chronic kidney disease) stage 3, GFR 30-59 ml/min  HTN (hypertension)  DM (diabetes mellitus)  H/O eye surgery  H/O tubal ligation    MEDICATIONS  (STANDING):  aspirin enteric coated 81 milliGRAM(s) Oral daily  atorvastatin 20 milliGRAM(s) Oral at bedtime  dextrose 5%. 1000 milliLiter(s) (50 mL/Hr) IV Continuous <Continuous>  dextrose 50% Injectable 12.5 Gram(s) IV Push once  dextrose 50% Injectable 25 Gram(s) IV Push once  dextrose 50% Injectable 25 Gram(s) IV Push once  heparin  Injectable 5000 Unit(s) SubCutaneous every 8 hours  insulin lispro (HumaLOG) corrective regimen sliding scale   SubCutaneous Before meals and at bedtime  magnesium sulfate  IVPB 2 Gram(s) IV Intermittent once  metoprolol succinate ER 25 milliGRAM(s) Oral daily    MEDICATIONS  (PRN):  dextrose 40% Gel 15 Gram(s) Oral once PRN Blood Glucose LESS THAN 70 milliGRAM(s)/deciliter  glucagon  Injectable 1 milliGRAM(s) IntraMuscular once PRN Glucose LESS THAN 70 milligrams/deciliter    ICU Vital Signs Last 24 Hrs  T(C): 36.8 (30 Oct 2018 09:10), Max: 37.2 (29 Oct 2018 12:10)  T(F): 98.2 (30 Oct 2018 09:10), Max: 98.9 (29 Oct 2018 12:10)  HR: 63 (30 Oct 2018 09:10) (57 - 64)  BP: 154/78 (30 Oct 2018 09:10) (154/78 - 160/72)  BP(mean): 100 (29 Oct 2018 18:33) (100 - 100)  ABP: --  ABP(mean): --  RR: 16 (30 Oct 2018 09:10) (16 - 18)  SpO2: 97% (30 Oct 2018 09:10) (96% - 100%)      lungs clear  CV s1 s2  Abd soft  Ext stable                          9.9    6.2   )-----------( 177      ( 30 Oct 2018 05:51 )             29.8   CARDIAC MARKERS ( 30 Oct 2018 05:51 )  x     / 0.03 ng/mL / 76 U/L / x     / 2.0 ng/mL  CARDIAC MARKERS ( 29 Oct 2018 19:43 )  x     / <0.01 ng/mL / 81 U/L / x     / 1.7 ng/mL  CARDIAC MARKERS ( 29 Oct 2018 12:45 )  x     / 0.02 ng/mL / 86 U/L / x     / 1.8 ng/mL    10-30    140  |  104  |  20  ----------------------------<  86  4.2   |  24  |  1.29    Ca    10.2      30 Oct 2018 05:51  Mg     1.6     10-30    TPro  7.7  /  Alb  4.3  /  TBili  0.4  /  DBili  x   /  AST  21  /  ALT  20  /  AlkPhos  63  10-29

## 2018-10-30 NOTE — PROGRESS NOTE ADULT - SUBJECTIVE AND OBJECTIVE BOX
Interventional Cardiology PA Adult Progress Note    CC: chest pain on admission  Subjective Assessment: Pt was examined at bedside this AM. States she is feeling well, with improvement of CP. Denies CP or SOB  12 point ROS negative except as per subjective  	  MEDICATIONS:  metoprolol succinate ER 25 milliGRAM(s) Oral daily  atorvastatin 20 milliGRAM(s) Oral at bedtime  dextrose 40% Gel 15 Gram(s) Oral once PRN  dextrose 50% Injectable 12.5 Gram(s) IV Push once  dextrose 50% Injectable 25 Gram(s) IV Push once  dextrose 50% Injectable 25 Gram(s) IV Push once  glucagon  Injectable 1 milliGRAM(s) IntraMuscular once PRN  insulin lispro (HumaLOG) corrective regimen sliding scale   SubCutaneous Before meals and at bedtime  aspirin enteric coated 81 milliGRAM(s) Oral daily  dextrose 5%. 1000 milliLiter(s) IV Continuous <Continuous>  heparin  Injectable 5000 Unit(s) SubCutaneous every 8 hours  sodium chloride 0.9%. 500 milliLiter(s) IV Continuous <Continuous>      	    [PHYSICAL EXAM:  TELEMETRY:  T(C): 36.6 (10-30-18 @ 14:00), Max: 37.2 (10-29-18 @ 18:33)  HR: 58 (10-30-18 @ 14:00) (57 - 64)  BP: 137/65 (10-30-18 @ 14:00) (137/65 - 158/64)  RR: 16 (10-30-18 @ 14:00) (16 - 18)  SpO2: 96% (10-30-18 @ 14:00) (96% - 100%)    I&O's Summary      Weight (kg): 60.2 (10-29 @ 18:33)    Ortiz: No  Central/PICC/Mid Line: No                                         Neck: Supple, - JVD; no Carotid Bruit   Cardiovascular: Normal S1 S2, No JVD, No murmurs,   Respiratory: Lungs clear to auscultation, No Rales, Rhonchi, Wheezing	  Gastrointestinal:  Soft, Non-tender, + BS	  Skin: No rashes, No ecchymoses, No cyanosis  Extremities: Normal range of motion, No clubbing, cyanosis or edema  Vascular: Peripheral pulses palpable 2+ bilaterally  Neurologic: Non-focal  Psychiatry: A & O x 3, Mood & affect appropriate      	    	    LABS:	 	  CARDIAC MARKERS ( 30 Oct 2018 11:02 )  x     / 0.03 ng/mL / x     / x     / x      CARDIAC MARKERS ( 30 Oct 2018 05:51 )  x     / 0.03 ng/mL / 76 U/L / x     / 2.0 ng/mL  CARDIAC MARKERS ( 29 Oct 2018 19:43 )  x     / <0.01 ng/mL / 81 U/L / x     / 1.7 ng/mL  CARDIAC MARKERS ( 29 Oct 2018 12:45 )  x     / 0.02 ng/mL / 86 U/L / x     / 1.8 ng/mL                          9.9    6.2   )-----------( 177      ( 30 Oct 2018 05:51 )             29.8     10-30    140  |  104  |  20  ----------------------------<  86  4.2   |  24  |  1.29    Ca    10.2      30 Oct 2018 05:51  Mg     1.6     10-30    TPro  7.7  /  Alb  4.3  /  TBili  0.4  /  DBili  x   /  AST  21  /  ALT  20  /  AlkPhos  63  10-29      HgA1c: Hemoglobin A1C, Whole Blood: 8.3 % (10-30 @ 05:51)    TSH: Thyroid Stimulating Hormone, Serum: 1.866 uIU/mL (10-30 @ 05:51)    PT/INR - ( 29 Oct 2018 12:45 )   PT: 11.4 sec;   INR: 1.03          PTT - ( 29 Oct 2018 12:45 )  PTT:27.1 sec

## 2018-10-30 NOTE — PROGRESS NOTE ADULT - ASSESSMENT
Patient is a 67 y/o female with PMHx of HTN, HLD, DM II, CKD stage 3 (unknown baseline Cr) who is s/p diagnostic cath revealing 3VCAD CTS consulted.

## 2018-10-30 NOTE — CONSULT NOTE ADULT - ASSESSMENT
A/P: 68 year old female (visiting from [employer]) with FHx of CAD and PMHx of MI medically managed 7 months ago, HTN, HLD, DM, CKD stage 3 (baseline Cr unknown), presented to [facility] ED 10/29 complaining of 8/10 stabbing chest pain that radiated to her left shoulder x 1 day, associated with nausea and SOB. Patient states that since her MI 7 months ago she has had intermittent chest pain at rest and on exertion. In the ED, CTA coronaries revealed moderately elevated calcium score with severe stenosis due to calcific and noncalcific plaque pLAD, D1, mLCx, mRCA and dRCA. She was admitted to cardiology for further workup and underwent cardiac cath today revealing 3VD: 50% oLM lesion, 90-95% pLAD lesion, 90% D1 lesion, 80% pLCx lesion, 90% OM1 lesion, 100% mRCA lesion with L-R collaterals, EF:55%, CT surgery consulted for surgical evaluation.   - Case discussed with Dr. Burnett, plan for CABG tentatively this Thursday. Please obtain preoperative workup including CBC, CMP, Coags, Lipid Panel, TSH, Hemoglobin A1c, Pro-BNP, Cardiac Enzymes, Type & Screen x 2,  Room air ABG, UA, Carotid US, TTE, CXR Pa/Lat, EKG, Bedside PFTS   - Hx of CKD, baseline Cr unknown please hydrate overnight 2/2 contrast today   - please continue asa 81mg and perioperatively beta blockers. Please hold plavix 2/2 upcoming surgery  - Please hold ACEi 2/2 risk of periop hypotension   - Continue medical management as per primary team   - Will continue to follow

## 2018-10-30 NOTE — CONSULT NOTE ADULT - ATTENDING COMMENTS
A/P:68y Female with hx of type 2 DM presenting for management of chest pain, now with good glycemic control while NPO awaiting cardiac catheterization    1.  DM - type 2, uncontrolled but not severe, likely exacerbated by diet as pt has had good glycemic control while NPO without receiving long acting insulin.     Can continue moderate dose sliding scale with meals and at bedtime for now.    Pt may need help with prandial glycemic control when meals resume.     Please dilute all medications in NS, not in D5, including infusions when possible.   Please continue consistent carbhoydrate diet while pt is eating.  Please obtain nutrition consult    Pt's fingerstick glucose goal is 100-180    Will continue to monitor     For discharge, pt can continue metformin 1000mg twice daily, and will adjust pending clinical course.     Pt is advised to follow up with me.  Please call  to make an appointment.

## 2018-10-31 DIAGNOSIS — Z91.89 OTHER SPECIFIED PERSONAL RISK FACTORS, NOT ELSEWHERE CLASSIFIED: ICD-10-CM

## 2018-10-31 DIAGNOSIS — I65.29 OCCLUSION AND STENOSIS OF UNSPECIFIED CAROTID ARTERY: ICD-10-CM

## 2018-10-31 DIAGNOSIS — I25.10 ATHEROSCLEROTIC HEART DISEASE OF NATIVE CORONARY ARTERY WITHOUT ANGINA PECTORIS: ICD-10-CM

## 2018-10-31 DIAGNOSIS — R63.8 OTHER SYMPTOMS AND SIGNS CONCERNING FOOD AND FLUID INTAKE: ICD-10-CM

## 2018-10-31 PROBLEM — E78.5 HYPERLIPIDEMIA, UNSPECIFIED: Chronic | Status: ACTIVE | Noted: 2018-10-29

## 2018-10-31 PROBLEM — E11.9 TYPE 2 DIABETES MELLITUS WITHOUT COMPLICATIONS: Chronic | Status: ACTIVE | Noted: 2018-10-29

## 2018-10-31 PROBLEM — Z00.00 ENCOUNTER FOR PREVENTIVE HEALTH EXAMINATION: Status: ACTIVE | Noted: 2018-10-31

## 2018-10-31 PROBLEM — I10 ESSENTIAL (PRIMARY) HYPERTENSION: Chronic | Status: ACTIVE | Noted: 2018-10-29

## 2018-10-31 PROBLEM — N18.3 CHRONIC KIDNEY DISEASE, STAGE 3 (MODERATE): Chronic | Status: ACTIVE | Noted: 2018-10-29

## 2018-10-31 LAB
ALBUMIN SERPL ELPH-MCNC: 3.9 G/DL — SIGNIFICANT CHANGE UP (ref 3.3–5)
ALP SERPL-CCNC: 55 U/L — SIGNIFICANT CHANGE UP (ref 40–120)
ALT FLD-CCNC: 15 U/L — SIGNIFICANT CHANGE UP (ref 10–45)
ANION GAP SERPL CALC-SCNC: 12 MMOL/L — SIGNIFICANT CHANGE UP (ref 5–17)
ANION GAP SERPL CALC-SCNC: 16 MMOL/L — SIGNIFICANT CHANGE UP (ref 5–17)
APPEARANCE UR: CLEAR — SIGNIFICANT CHANGE UP
APTT BLD: 35.7 SEC — SIGNIFICANT CHANGE UP (ref 27.5–36.3)
APTT BLD: 47.5 SEC — HIGH (ref 27.5–36.3)
APTT BLD: 78.1 SEC — HIGH (ref 27.5–36.3)
AST SERPL-CCNC: 17 U/L — SIGNIFICANT CHANGE UP (ref 10–40)
BILIRUB SERPL-MCNC: 0.3 MG/DL — SIGNIFICANT CHANGE UP (ref 0.2–1.2)
BILIRUB UR-MCNC: NEGATIVE — SIGNIFICANT CHANGE UP
BLD GP AB SCN SERPL QL: NEGATIVE — SIGNIFICANT CHANGE UP
BUN SERPL-MCNC: 27 MG/DL — HIGH (ref 7–23)
BUN SERPL-MCNC: 29 MG/DL — HIGH (ref 7–23)
CALCIUM SERPL-MCNC: 10 MG/DL — SIGNIFICANT CHANGE UP (ref 8.4–10.5)
CALCIUM SERPL-MCNC: 10.1 MG/DL — SIGNIFICANT CHANGE UP (ref 8.4–10.5)
CHLORIDE SERPL-SCNC: 100 MMOL/L — SIGNIFICANT CHANGE UP (ref 96–108)
CHLORIDE SERPL-SCNC: 102 MMOL/L — SIGNIFICANT CHANGE UP (ref 96–108)
CO2 SERPL-SCNC: 21 MMOL/L — LOW (ref 22–31)
CO2 SERPL-SCNC: 24 MMOL/L — SIGNIFICANT CHANGE UP (ref 22–31)
COLOR SPEC: YELLOW — SIGNIFICANT CHANGE UP
CREAT SERPL-MCNC: 1.36 MG/DL — HIGH (ref 0.5–1.3)
CREAT SERPL-MCNC: 1.4 MG/DL — HIGH (ref 0.5–1.3)
DIFF PNL FLD: ABNORMAL
GLUCOSE BLDC GLUCOMTR-MCNC: 167 MG/DL — HIGH (ref 70–99)
GLUCOSE BLDC GLUCOMTR-MCNC: 221 MG/DL — HIGH (ref 70–99)
GLUCOSE BLDC GLUCOMTR-MCNC: 250 MG/DL — HIGH (ref 70–99)
GLUCOSE SERPL-MCNC: 158 MG/DL — HIGH (ref 70–99)
GLUCOSE SERPL-MCNC: 247 MG/DL — HIGH (ref 70–99)
GLUCOSE UR QL: NEGATIVE — SIGNIFICANT CHANGE UP
HCT VFR BLD CALC: 29.8 % — LOW (ref 34.5–45)
HGB BLD-MCNC: 9.8 G/DL — LOW (ref 11.5–15.5)
INR BLD: 1.02 — SIGNIFICANT CHANGE UP (ref 0.88–1.16)
KETONES UR-MCNC: NEGATIVE — SIGNIFICANT CHANGE UP
LEUKOCYTE ESTERASE UR-ACNC: NEGATIVE — SIGNIFICANT CHANGE UP
MAGNESIUM SERPL-MCNC: 1.8 MG/DL — SIGNIFICANT CHANGE UP (ref 1.6–2.6)
MCHC RBC-ENTMCNC: 26.8 PG — LOW (ref 27–34)
MCHC RBC-ENTMCNC: 32.9 G/DL — SIGNIFICANT CHANGE UP (ref 32–36)
MCV RBC AUTO: 81.4 FL — SIGNIFICANT CHANGE UP (ref 80–100)
NITRITE UR-MCNC: NEGATIVE — SIGNIFICANT CHANGE UP
NT-PROBNP SERPL-SCNC: 2329 PG/ML — HIGH (ref 0–300)
PH UR: 6 — SIGNIFICANT CHANGE UP (ref 5–8)
PLATELET # BLD AUTO: 188 K/UL — SIGNIFICANT CHANGE UP (ref 150–400)
POTASSIUM SERPL-MCNC: 4.7 MMOL/L — SIGNIFICANT CHANGE UP (ref 3.5–5.3)
POTASSIUM SERPL-MCNC: 4.8 MMOL/L — SIGNIFICANT CHANGE UP (ref 3.5–5.3)
POTASSIUM SERPL-SCNC: 4.7 MMOL/L — SIGNIFICANT CHANGE UP (ref 3.5–5.3)
POTASSIUM SERPL-SCNC: 4.8 MMOL/L — SIGNIFICANT CHANGE UP (ref 3.5–5.3)
PROT SERPL-MCNC: 6.8 G/DL — SIGNIFICANT CHANGE UP (ref 6–8.3)
PROT UR-MCNC: ABNORMAL MG/DL
PROTHROM AB SERPL-ACNC: 11.5 SEC — SIGNIFICANT CHANGE UP (ref 10–12.9)
RBC # BLD: 3.66 M/UL — LOW (ref 3.8–5.2)
RBC # FLD: 12.2 % — SIGNIFICANT CHANGE UP (ref 10.3–16.9)
RH IG SCN BLD-IMP: POSITIVE — SIGNIFICANT CHANGE UP
SODIUM SERPL-SCNC: 136 MMOL/L — SIGNIFICANT CHANGE UP (ref 135–145)
SODIUM SERPL-SCNC: 139 MMOL/L — SIGNIFICANT CHANGE UP (ref 135–145)
SP GR SPEC: 1.02 — SIGNIFICANT CHANGE UP (ref 1–1.03)
UROBILINOGEN FLD QL: 0.2 E.U./DL — SIGNIFICANT CHANGE UP
WBC # BLD: 6.1 K/UL — SIGNIFICANT CHANGE UP (ref 3.8–10.5)
WBC # FLD AUTO: 6.1 K/UL — SIGNIFICANT CHANGE UP (ref 3.8–10.5)

## 2018-10-31 PROCEDURE — 93880 EXTRACRANIAL BILAT STUDY: CPT | Mod: 26

## 2018-10-31 PROCEDURE — 71045 X-RAY EXAM CHEST 1 VIEW: CPT | Mod: 26

## 2018-10-31 PROCEDURE — 93010 ELECTROCARDIOGRAM REPORT: CPT | Mod: 77

## 2018-10-31 PROCEDURE — 93306 TTE W/DOPPLER COMPLETE: CPT | Mod: 26

## 2018-10-31 PROCEDURE — 36620 INSERTION CATHETER ARTERY: CPT

## 2018-10-31 PROCEDURE — 93010 ELECTROCARDIOGRAM REPORT: CPT

## 2018-10-31 PROCEDURE — 94010 BREATHING CAPACITY TEST: CPT | Mod: 26

## 2018-10-31 RX ORDER — HEPARIN SODIUM 5000 [USP'U]/ML
INJECTION INTRAVENOUS; SUBCUTANEOUS
Qty: 25000 | Refills: 0 | Status: DISCONTINUED | OUTPATIENT
Start: 2018-10-31 | End: 2018-11-01

## 2018-10-31 RX ORDER — CHLORHEXIDINE GLUCONATE 213 G/1000ML
10 SOLUTION TOPICAL ONCE
Qty: 0 | Refills: 0 | Status: COMPLETED | OUTPATIENT
Start: 2018-10-31 | End: 2018-11-01

## 2018-10-31 RX ORDER — MAGNESIUM OXIDE 400 MG ORAL TABLET 241.3 MG
800 TABLET ORAL ONCE
Qty: 0 | Refills: 0 | Status: COMPLETED | OUTPATIENT
Start: 2018-10-31 | End: 2018-10-31

## 2018-10-31 RX ORDER — NITROGLYCERIN 6.5 MG
0.5 CAPSULE, EXTENDED RELEASE ORAL ONCE
Qty: 0 | Refills: 0 | Status: COMPLETED | OUTPATIENT
Start: 2018-10-31 | End: 2018-10-31

## 2018-10-31 RX ORDER — METOPROLOL TARTRATE 50 MG
25 TABLET ORAL ONCE
Qty: 0 | Refills: 0 | Status: COMPLETED | OUTPATIENT
Start: 2018-10-31 | End: 2018-10-31

## 2018-10-31 RX ORDER — INSULIN LISPRO 100/ML
3 VIAL (ML) SUBCUTANEOUS
Qty: 0 | Refills: 0 | Status: COMPLETED | OUTPATIENT
Start: 2018-10-31 | End: 2018-10-31

## 2018-10-31 RX ORDER — HEPARIN SODIUM 5000 [USP'U]/ML
INJECTION INTRAVENOUS; SUBCUTANEOUS
Qty: 25000 | Refills: 0 | Status: DISCONTINUED | OUTPATIENT
Start: 2018-10-31 | End: 2018-10-31

## 2018-10-31 RX ORDER — HEPARIN SODIUM 5000 [USP'U]/ML
3500 INJECTION INTRAVENOUS; SUBCUTANEOUS EVERY 6 HOURS
Qty: 0 | Refills: 0 | Status: DISCONTINUED | OUTPATIENT
Start: 2018-10-31 | End: 2018-10-31

## 2018-10-31 RX ORDER — SODIUM CHLORIDE 9 MG/ML
500 INJECTION INTRAMUSCULAR; INTRAVENOUS; SUBCUTANEOUS
Qty: 0 | Refills: 0 | Status: DISCONTINUED | OUTPATIENT
Start: 2018-10-31 | End: 2018-11-01

## 2018-10-31 RX ORDER — CHLORHEXIDINE GLUCONATE 213 G/1000ML
1 SOLUTION TOPICAL ONCE
Qty: 0 | Refills: 0 | Status: COMPLETED | OUTPATIENT
Start: 2018-10-31 | End: 2018-10-31

## 2018-10-31 RX ORDER — METOPROLOL TARTRATE 50 MG
50 TABLET ORAL DAILY
Qty: 0 | Refills: 0 | Status: DISCONTINUED | OUTPATIENT
Start: 2018-11-01 | End: 2018-11-01

## 2018-10-31 RX ORDER — ATORVASTATIN CALCIUM 80 MG/1
40 TABLET, FILM COATED ORAL AT BEDTIME
Qty: 0 | Refills: 0 | Status: DISCONTINUED | OUTPATIENT
Start: 2018-10-31 | End: 2018-11-01

## 2018-10-31 RX ORDER — NITROGLYCERIN 6.5 MG
5 CAPSULE, EXTENDED RELEASE ORAL
Qty: 50 | Refills: 0 | Status: DISCONTINUED | OUTPATIENT
Start: 2018-10-31 | End: 2018-11-01

## 2018-10-31 RX ORDER — NITROGLYCERIN 6.5 MG
5 CAPSULE, EXTENDED RELEASE ORAL
Qty: 50 | Refills: 0 | Status: DISCONTINUED | OUTPATIENT
Start: 2018-10-31 | End: 2018-10-31

## 2018-10-31 RX ORDER — MORPHINE SULFATE 50 MG/1
2 CAPSULE, EXTENDED RELEASE ORAL ONCE
Qty: 0 | Refills: 0 | Status: DISCONTINUED | OUTPATIENT
Start: 2018-10-31 | End: 2018-10-31

## 2018-10-31 RX ORDER — CHLORHEXIDINE GLUCONATE 213 G/1000ML
1 SOLUTION TOPICAL ONCE
Qty: 0 | Refills: 0 | Status: COMPLETED | OUTPATIENT
Start: 2018-11-01 | End: 2018-11-01

## 2018-10-31 RX ADMIN — CHLORHEXIDINE GLUCONATE 1 APPLICATION(S): 213 SOLUTION TOPICAL at 19:54

## 2018-10-31 RX ADMIN — Medication 2: at 16:41

## 2018-10-31 RX ADMIN — Medication 3 UNIT(S): at 17:00

## 2018-10-31 RX ADMIN — CHLORHEXIDINE GLUCONATE 1 APPLICATION(S): 213 SOLUTION TOPICAL at 21:39

## 2018-10-31 RX ADMIN — Medication 25 MILLIGRAM(S): at 16:30

## 2018-10-31 RX ADMIN — Medication 2: at 21:39

## 2018-10-31 RX ADMIN — MORPHINE SULFATE 2 MILLIGRAM(S): 50 CAPSULE, EXTENDED RELEASE ORAL at 19:06

## 2018-10-31 RX ADMIN — Medication 1: at 07:11

## 2018-10-31 RX ADMIN — Medication 25 MILLIGRAM(S): at 05:24

## 2018-10-31 RX ADMIN — SODIUM CHLORIDE 50 MILLILITER(S): 9 INJECTION INTRAMUSCULAR; INTRAVENOUS; SUBCUTANEOUS at 21:08

## 2018-10-31 RX ADMIN — MORPHINE SULFATE 2 MILLIGRAM(S): 50 CAPSULE, EXTENDED RELEASE ORAL at 19:20

## 2018-10-31 RX ADMIN — Medication 1.5 MICROGRAM(S)/MIN: at 20:00

## 2018-10-31 RX ADMIN — HEPARIN SODIUM 5000 UNIT(S): 5000 INJECTION INTRAVENOUS; SUBCUTANEOUS at 05:24

## 2018-10-31 RX ADMIN — ATORVASTATIN CALCIUM 40 MILLIGRAM(S): 80 TABLET, FILM COATED ORAL at 21:32

## 2018-10-31 RX ADMIN — Medication 1.5 MICROGRAM(S)/MIN: at 16:30

## 2018-10-31 RX ADMIN — Medication 1.5 MICROGRAM(S)/MIN: at 11:27

## 2018-10-31 RX ADMIN — HEPARIN SODIUM 700 UNIT(S)/HR: 5000 INJECTION INTRAVENOUS; SUBCUTANEOUS at 17:04

## 2018-10-31 RX ADMIN — Medication 0.5 INCH(S): at 09:18

## 2018-10-31 RX ADMIN — MAGNESIUM OXIDE 400 MG ORAL TABLET 800 MILLIGRAM(S): 241.3 TABLET ORAL at 11:28

## 2018-10-31 RX ADMIN — Medication 2: at 12:07

## 2018-10-31 RX ADMIN — HEPARIN SODIUM 700 UNIT(S)/HR: 5000 INJECTION INTRAVENOUS; SUBCUTANEOUS at 09:54

## 2018-10-31 RX ADMIN — Medication 81 MILLIGRAM(S): at 11:28

## 2018-10-31 NOTE — PROCEDURE NOTE - NSPROCDETAILS_GEN_ALL_CORE
location identified, draped/prepped, sterile technique used, needle inserted/introduced/all materials/supplies accounted for at end of procedure/positive blood return obtained via catheter/Seldinger technique/connected to a pressurized flush line

## 2018-10-31 NOTE — DIETITIAN INITIAL EVALUATION ADULT. - OTHER INFO
Nutrition consult for diet edu by PA appreciated. 68 y.o F from home with PMHx of CKD stage III, DM, HLD, HTN. Pt admitted with CP to r/o ACS. Pt and family cooks at home, follows regular diet, does not monitor CHO intake, good appetite, NKFA. Pt reports checking fingersticks in the AM before breakfasting, typically 140-200 mg/dL. Denies any recent weight loss. Pt underwent cardiac cath 10/30. Plan for CABG tomorrow. Pt is currently tolerating DASH/TLC, CSTCHO diet with good >75% PO intake of lunch meal. Denies N/V/D/C or pain. Skin intact. RD will f/u per protocol.

## 2018-10-31 NOTE — PROGRESS NOTE ADULT - PROBLEM SELECTOR PLAN 8
F: will receive NS @ 50 cc/hr for 10 hours starting at 8pm  E: replete K<4, Mg<2  N: Dash/TLC; NPO after midnight for procedure    VTE Prophylaxis: Hep gtt  C: Full Code  D: CCU

## 2018-10-31 NOTE — PROCEDURE NOTE - NSINDICATIONS_GEN_A_CORE
blood sampling/cannulation purposes/critical patient/arterial puncture to obtain ABG's/monitoring purposes

## 2018-10-31 NOTE — PROGRESS NOTE ADULT - SUBJECTIVE AND OBJECTIVE BOX
Planned Date of Surgery:       18                                                                                                           Surgeon: Dr. Burnett    Procedure: CABG    HPI:  68 year old female (visiting from [facility]) with FHx of CAD and PMHx of MI medically managed 7 months ago, HTN, HLD, DM, CKD stage 3 (baseline Cr unknown), presented to [facility] ED 10/29 complaining of 8/10 stabbing chest pain that radiated to her left shoulder x 1 day, associated with nausea and SOB. Patient states that since her MI 7 months ago she has had intermittent chest pain at rest and on exertion. In the ED, CTA coronaries revealed moderately elevated calcium score with severe stenosis due to calcific and noncalcific plaque pLAD, D1, mLCx, mRCA and dRCA. She was admitted to cardiology for further workup and underwent cardiac cath today revealing 3VD: 50% oLM lesion, 90-95% pLAD lesion, 90% D1 lesion, 80% pLCx lesion, 90% OM1 lesion, 100% mRCA lesion with L-R collaterals, EF:55%, CT surgery consulted for surgical evaluation. Currently patient is chest pain free and denies any shortness of breath, palpitations, abdominal pain, nasuea, vomiting, recent illness.     PAST MEDICAL & SURGICAL HISTORY:  HLD (hyperlipidemia)  CKD (chronic kidney disease) stage 3, GFR 30-59 ml/min  HTN (hypertension)  DM (diabetes mellitus)  H/O eye surgery  H/O tubal ligation      No Known Allergies      MEDICATIONS  (STANDING):  aspirin enteric coated 81 milliGRAM(s) Oral daily  atorvastatin 40 milliGRAM(s) Oral at bedtime  chlorhexidine 0.12% Liquid 10 milliLiter(s) Swish and Spit once  chlorhexidine 4% Liquid 1 Application(s) Topical once  chlorhexidine 4% Liquid 1 Application(s) Topical once  chlorhexidine 4% Liquid 1 Application(s) Topical once  dextrose 5%. 1000 milliLiter(s) (50 mL/Hr) IV Continuous <Continuous>  heparin  Infusion.  Unit(s)/Hr (7 mL/Hr) IV Continuous <Continuous>  influenza   Vaccine 0.5 milliLiter(s) IntraMuscular once  insulin lispro (HumaLOG) corrective regimen sliding scale   SubCutaneous Before meals and at bedtime  nitroglycerin  Infusion 5 MICROgram(s)/Min (1.5 mL/Hr) IV Continuous <Continuous>  sodium chloride 0.9%. 500 milliLiter(s) (50 mL/Hr) IV Continuous <Continuous>    MEDICATIONS  (PRN):      On Beta Blocker? YES     Labs:                        9.8    6.1   )-----------( 188      ( 31 Oct 2018 07:06 )             29.8     10-31    136  |  100  |  27<H>  ----------------------------<  247<H>  4.8   |  24  |  1.40<H>    Ca    10.1      31 Oct 2018 10:59  Mg     1.8     10-31    TPro  6.8  /  Alb  3.9  /  TBili  0.3  /  DBili  x   /  AST  17  /  ALT  15  /  AlkPhos  55  10-31    PT/INR - ( 31 Oct 2018 10:59 )   PT: 11.5 sec;   INR: 1.02          PTT - ( 31 Oct 2018 16:38 )  PTT:78.1 sec  Urinalysis Basic - ( 31 Oct 2018 13:11 )    Color: Yellow / Appearance: Clear / S.020 / pH: x  Gluc: x / Ketone: NEGATIVE  / Bili: Negative / Urobili: 0.2 E.U./dL   Blood: x / Protein: Trace mg/dL / Nitrite: NEGATIVE   Leuk Esterase: NEGATIVE / RBC: < 5 /HPF / WBC < 5 /HPF   Sq Epi: x / Non Sq Epi: Moderate /HPF / Bacteria: Present /HPF      ABO Interpretation: O (10-31-18 @ 15:56)      CARDIAC MARKERS ( 30 Oct 2018 11:02 )  x     / 0.03 ng/mL / x     / x     / x      CARDIAC MARKERS ( 30 Oct 2018 05:51 )  x     / 0.03 ng/mL / 76 U/L / x     / 2.0 ng/mL  CARDIAC MARKERS ( 29 Oct 2018 19:43 )  x     / <0.01 ng/mL / 81 U/L / x     / 1.7 ng/mL          Hgb A1C: 8.3    EKG:  < from: 12 Lead ECG (10.31.18 @ 09:18) >    Ventricular Rate 68 BPM    Atrial Rate 68 BPM    P-R Interval 180 ms    QRS Duration 88 ms    Q-T Interval 392 ms    QTC Calculation(Bezet) 416 ms    P Axis 69 degrees    R Axis 22 degrees    T Axis 153 degrees    Diagnosis Line Normal sinus rhythm  Possible Left atrial enlargement  ST & T wave abnormality, consider lateral ischemia    < end of copied text >      CXR:  < from: Xray Chest 1 View-PORTABLE IMMEDIATE (10.29.18 @ 15:44) >  IMPRESSION:    Support Devices: None  Heart:  Unremarkable  Mediastinum:  Unremarkable  Lungs/Airways:  Unremarkable  Bones/Soft tissues: Unremarkable    < end of copied text >      CT Scans:  < from: CT Angio Cardiac w/ IV Cont (10.29.18 @ 16:17) >  IMPRESSION:   Mild small airway inflammation most pronounced within the right middle   lobe with some mild air trapping.    < end of copied text >      Cath Report:    Echo:  < from: Echocardiogram (10.31.18 @ 12:29) >  The left atrium is mildly dilated. Right atrial size is normal.There is   mild   aortic valve thickening. The aortic valve is trileaflet. No aortic   regurgitation noted. No hemodynamically significant valvular aortic   stenosis.   There is mild mitral valve thickening. There is mild mitral annular   calcification. There is trace to mild mitral regurgitation.  Structurally   normal tricuspid valve. There is trace tricuspid regurgitation.The   pulmonary   artery systolic pressure is estimated to be 29 mmHg.The pulmonic valve   is not   well visualized. No pulmonic regurgitation noted.The right ventricle is   normal   in size and function.There is mild concentric left ventricular   hypertrophy.   There is basal inferior wall hypokinesis. The other walls contract   normally.   The left ventricular ejection fraction is estimated to be 50-55%  No   aortic   root dilatation.There is no pericardial effusion.    < end of copied text >      PFT's:    Carotid Duplex:  < from: US Duplex Carotid Arteries Complete, Bilateral (10.31.18 @ 12:32) >  IMPRESSION: No evidence of hemodynamically significant right carotid   stenosis.  70% or greater diameter stenosis of the left internal carotid artery. In   this case critical stenosis.    < end of copied text >      Consult in Chart?  YES   Consent in Chart? YES   Pre-op Orders Placed? YES   Blood Products Ordered? YES   NPO ordered? YES

## 2018-10-31 NOTE — PROGRESS NOTE ADULT - PROBLEM SELECTOR PLAN 2
Currently with left shoulder pain today and HTN to 170s w/ new t-wave inversions  - Trop peaked at 0.03  - Repeat ECG on 10/31/18 revealed NSR @ 68 BPM with TWI in I AVL and new TWI in V4-V6 not present on admission.   - TTE: LA mildly dilated, mild aortic valve thickening, no significant AS, mild mitral valve thickening, mild mitral annular calcification, trace to mild MR, PASP 29 mmHg,  mild concentric LVH, basal inferior wall hypokinesis, LVEF 50-55%  - Started on nitro gtt with SBP goal 130s and heparin gtt initiated   - Continue ASA 81 mg PO QD, Toprol XL 50 mg PO QD  - for CABG tomorrow
SBP 150s  -continue Toprol 25mg QD  -monitor BP
SBP 170s this AM  -Toprol 25mg QD uptitrated to 50 mg PO QD  - Nitro gtt started in light of left shoulder pain, HTN, and new TWI in V4-V6  - Continue to monitor BP and down titrate nitro gtt as able

## 2018-10-31 NOTE — PROGRESS NOTE ADULT - PROBLEM SELECTOR PLAN 3
-LDL 75  -continue atorvastatin 20mg QD
SBP 170s this AM. Now on nitro drip w/ improvement to   - Toprol 25mg QD uptitrated to 50 mg PO QD  - C/w Nitro gtt and decrease as tolerated
-LDL 75, HDL 32, total cholesterol 134  - Atorvastatin 20mg QD uptitrated to 40 mg PO QD

## 2018-10-31 NOTE — PROGRESS NOTE ADULT - ASSESSMENT
Patient is a 69 y/o female with PMHx of HTN, HLD, DM II, CKD stage 3 (unknown baseline Cr) who is s/p diagnostic cath revealing 3VCAD, Dr. Burnett consulted with plan for CABG on 11/1/18 and undergoing pre-op testing, course c/b left shoulder pain with new TWI in V4-V6 not present on initial ECG and heparin gtt started, pt also note dto be hypertensive to 170s 10/31 AM and nitro gtt started with goal 130s-140s. NPO after MN for CABG.

## 2018-10-31 NOTE — PROGRESS NOTE ADULT - SUBJECTIVE AND OBJECTIVE BOX
Hospital course:  67 y/o female (visiting from [facility]) with FHx of CAD PMHx of silent MI (medically managed), HTN, HLD, DM II, CKD stage III (unknown baseline Cr) who presented to [facility] ED on 10/29/2018 c/o stabbing chest pain (8/10 severity) that radiates to left shoulder that started last night which is associated with SOB and nausea. Patient states she has had intermittent episodes of chest pain for the past 6 months worsening prior to admission and JAFFE for 1 year. In ED VS /73, HR 64, RR 17, O2 100%, Temp 98.9. EKG no ischemic changes. Trop x1 0.02, Cr. 1.3. CTA coronaries Ca score moderately elevated at 366, severe stenosis due to calcific and noncalcific plaque pLAD, D1, mLCx, mRCA and dRCA. Diagnostic cath completed on 10/30 revealing 3VD: 50% oLM lesion, 90-95% pLAD lesion, 90% D1 lesion, 80% pLCx lesion, 90% OM1 lesion, 100% mRCA lesion with L-R collaterals, EF:55%, EDP 17mmHg. R radial. CTS Dr. Burnett consulted with plan for CABG on 18. Course c/b shoulder pain described as sharp and of 8/10 intensity with new TWI in V4-V6 but left shoulder pain resolved with initiation and uptitration of nitro gtt. Pt also found to be hypertensive to 170s 10/31 AM with improvement to 140s after uptitration of nitro gtt. Heparin gtt started for AC given new TWI on ECG. Majority of pre-op testing complete other than CXR and PFTs (spoke with both teams who will complete today). Creatinine noted 1.34>1.29>1.36>1.4 with plan for recheck BMP at 6PM and gentle hydration overnight.  Last T + S to be sent at 6PM as well. Endocrinology consulted for A1C 8.3 recommending lispro at dinner and no lantus tonight as pt NPO After MN for CABG. As per d/w Dr. Burnett, in light of severe 3VCAD, pt for transfer to CCU for higher level of care with A-line and appropriate uptitration of nitro gtt for hypertensive management.     Overnight Events:  Subjective: Patient was seen and examined at bedside.    VITALS  Vital Signs Last 24 Hrs  T(C): 36.6 (31 Oct 2018 13:42), Max: 36.8 (31 Oct 2018 00:34)  T(F): 97.9 (31 Oct 2018 13:42), Max: 98.3 (31 Oct 2018 00:34)  HR: 68 (31 Oct 2018 13:04) (60 - 70)  BP: 144/67 (31 Oct 2018 13:04) (130/62 - 185/76)  BP(mean): --  RR: 18 (31 Oct 2018 13:04) (16 - 18)  SpO2: 99% (31 Oct 2018 05:20) (99% - 100%)    I&O's Summary    30 Oct 2018 07:01  -  31 Oct 2018 07:00  --------------------------------------------------------  IN: 705 mL / OUT: 0 mL / NET: 705 mL    31 Oct 2018 07:01  -  31 Oct 2018 15:34  --------------------------------------------------------  IN: 596 mL / OUT: 0 mL / NET: 596 mL        CAPILLARY BLOOD GLUCOSE      POCT Blood Glucose.: 250 mg/dL (31 Oct 2018 10:50)  POCT Blood Glucose.: 167 mg/dL (31 Oct 2018 06:48)  POCT Blood Glucose.: 214 mg/dL (30 Oct 2018 21:30)  POCT Blood Glucose.: 95 mg/dL (30 Oct 2018 16:44)      PHYSICAL EXAM  General: AO x 3, NAD, Comfortable, Pleasant, Anxious, Agitated, Ill, Frail, Cachectic, Resp distress  HEENT: PERRL/ EOMI, no scleral icterus, no ptosis, MMM, JVD, no thyromegaly  Respiratory: CTA b/l, no wheezes, rales or rhonchi  Cardiovascular: Regular, +S1 + S2  Abdomen: Soft, NTND, normoactive bowel sounds, no rebound, no guarding, no suprapubic tenderness  Extremities: No cyanosis, no clubbing, no edema, pulses equal, no calf tenderness  Skin: No rashes  Lymphatic: No cervical/supraclavicular LAD  Neurological: CN II-XII grossly intact, follows commands, moves all extremities    MEDICATIONS  (STANDING):  aspirin enteric coated 81 milliGRAM(s) Oral daily  atorvastatin 40 milliGRAM(s) Oral at bedtime  chlorhexidine 0.12% Liquid 10 milliLiter(s) Swish and Spit once  chlorhexidine 4% Liquid 1 Application(s) Topical once  chlorhexidine 4% Liquid 1 Application(s) Topical once  chlorhexidine 4% Liquid 1 Application(s) Topical once  dextrose 5%. 1000 milliLiter(s) (50 mL/Hr) IV Continuous <Continuous>  heparin  Infusion.  Unit(s)/Hr (7 mL/Hr) IV Continuous <Continuous>  influenza   Vaccine 0.5 milliLiter(s) IntraMuscular once  insulin lispro (HumaLOG) corrective regimen sliding scale   SubCutaneous Before meals and at bedtime  insulin lispro Injectable (HumaLOG) 3 Unit(s) SubCutaneous before dinner  metoprolol succinate ER 25 milliGRAM(s) Oral once  nitroglycerin  Infusion 5 MICROgram(s)/Min (1.5 mL/Hr) IV Continuous <Continuous>  sodium chloride 0.9%. 500 milliLiter(s) (75 mL/Hr) IV Continuous <Continuous>  sodium chloride 0.9%. 500 milliLiter(s) (50 mL/Hr) IV Continuous <Continuous>    MEDICATIONS  (PRN):  heparin  Injectable 3500 Unit(s) IV Push every 6 hours PRN For aPTT less than 40      LABS                        9.8    6.1   )-----------( 188      ( 31 Oct 2018 07:06 )             29.8     10    136  |  100  |  27<H>  ----------------------------<  247<H>  4.8   |  24  |  1.40<H>    Ca    10.1      31 Oct 2018 10:59  Mg     1.8     10-31    TPro  6.8  /  Alb  3.9  /  TBili  0.3  /  DBili  x   /  AST  17  /  ALT  15  /  AlkPhos  55  10-31    LIVER FUNCTIONS - ( 31 Oct 2018 10:59 )  Alb: 3.9 g/dL / Pro: 6.8 g/dL / ALK PHOS: 55 U/L / ALT: 15 U/L / AST: 17 U/L / GGT: x           PT/INR - ( 31 Oct 2018 10:59 )   PT: 11.5 sec;   INR: 1.02          PTT - ( 31 Oct 2018 10:59 )  PTT:47.5 sec  Urinalysis Basic - ( 31 Oct 2018 13:11 )    Color: Yellow / Appearance: Clear / S.020 / pH: x  Gluc: x / Ketone: NEGATIVE  / Bili: Negative / Urobili: 0.2 E.U./dL   Blood: x / Protein: Trace mg/dL / Nitrite: NEGATIVE   Leuk Esterase: NEGATIVE / RBC: < 5 /HPF / WBC < 5 /HPF   Sq Epi: x / Non Sq Epi: Moderate /HPF / Bacteria: Present /HPF      CARDIAC MARKERS ( 30 Oct 2018 11:02 )  x     / 0.03 ng/mL / x     / x     / x      CARDIAC MARKERS ( 30 Oct 2018 05:51 )  x     / 0.03 ng/mL / 76 U/L / x     / 2.0 ng/mL  CARDIAC MARKERS ( 29 Oct 2018 19:43 )  x     / <0.01 ng/mL / 81 U/L / x     / 1.7 ng/mL        Radiology and other tests: Reviewed Hospital course:  69 y/o female (visiting from [facility]) with FHx of CAD PMHx of silent MI (medically managed), HTN, HLD, DM II, CKD stage III (unknown baseline Cr) who presented to [facility] ED on 10/29/2018 c/o stabbing chest pain (8/10 severity) that radiates to left shoulder that started last night which is associated with SOB and nausea. Patient states she has had intermittent episodes of chest pain for the past 6 months worsening prior to admission and JAFFE for 1 year. In ED VS /73, HR 64, RR 17, O2 100%, Temp 98.9. EKG no ischemic changes. Trop x1 0.02, Cr. 1.3. CTA coronaries Ca score moderately elevated at 366, severe stenosis due to calcific and noncalcific plaque pLAD, D1, mLCx, mRCA and dRCA. Diagnostic cath completed on 10/30 revealing 3VD: 50% oLM lesion, 90-95% pLAD lesion, 90% D1 lesion, 80% pLCx lesion, 90% OM1 lesion, 100% mRCA lesion with L-R collaterals, EF:55%, EDP 17mmHg. R radial. CTS Dr. Burnett consulted with plan for CABG on 18. Course c/b shoulder pain described as sharp and of 8/10 intensity with new TWI in V4-V6 but left shoulder pain resolved with initiation and uptitration of nitro gtt. Pt also found to be hypertensive to 170s 10/31 AM with improvement to 140s after uptitration of nitro gtt. Heparin gtt started for AC given new TWI on ECG. Majority of pre-op testing complete other than CXR and PFTs (spoke with both teams who will complete today). Creatinine noted 1.34>1.29>1.36>1.4 with plan for recheck BMP at 6PM and gentle hydration overnight.  Last T + S to be sent at 6PM as well. Endocrinology consulted for A1C 8.3 recommending lispro at dinner and no lantus tonight as pt NPO After MN for CABG. As per d/w Dr. Burnett, in light of severe 3VCAD, pt for transfer to CCU for higher level of care with A-line and appropriate uptitration of nitro gtt for hypertensive management.     Overnight Events:  Subjective: Patient was seen and examined at bedside.    VITALS  Vital Signs Last 24 Hrs  T(C): 36.6 (31 Oct 2018 13:42), Max: 36.8 (31 Oct 2018 00:34)  T(F): 97.9 (31 Oct 2018 13:42), Max: 98.3 (31 Oct 2018 00:34)  HR: 68 (31 Oct 2018 13:04) (60 - 70)  BP: 144/67 (31 Oct 2018 13:04) (130/62 - 185/76)  BP(mean): --  RR: 18 (31 Oct 2018 13:04) (16 - 18)  SpO2: 99% (31 Oct 2018 05:20) (99% - 100%)    I&O's Summary    30 Oct 2018 07:01  -  31 Oct 2018 07:00  --------------------------------------------------------  IN: 705 mL / OUT: 0 mL / NET: 705 mL    31 Oct 2018 07:01  -  31 Oct 2018 15:34  --------------------------------------------------------  IN: 596 mL / OUT: 0 mL / NET: 596 mL        CAPILLARY BLOOD GLUCOSE      POCT Blood Glucose.: 250 mg/dL (31 Oct 2018 10:50)  POCT Blood Glucose.: 167 mg/dL (31 Oct 2018 06:48)  POCT Blood Glucose.: 214 mg/dL (30 Oct 2018 21:30)  POCT Blood Glucose.: 95 mg/dL (30 Oct 2018 16:44)      PHYSICAL EXAM  General: Elderly female sitting up in bed in NAD  HEENT: PERRL/ EOMI, no scleral icterus, no ptosis, MMM, JVD, no thyromegaly; no carotid bruits  Chest: Tender to palpation   Respiratory: CTA b/l, no wheezes, rales or rhonchi  Cardiovascular: RRR, +S1 + S2; no MRG  Abdomen: Soft, NTND, normoactive bowel sounds, no rebound, no guarding, no suprapubic tenderness  Extremities: No cyanosis, no clubbing, no edema, pulses equal, no calf tenderness; L shoulder tender to palpation, unwilling to perform active movement of L shoulder; pain elicited on passive movement of L shoulder  Skin: No rashes  Lymphatic: No cervical/supraclavicular LAD  Neurological: CN II-XII grossly intact, follows commands, moves all extremities    MEDICATIONS  (STANDING):  aspirin enteric coated 81 milliGRAM(s) Oral daily  atorvastatin 40 milliGRAM(s) Oral at bedtime  chlorhexidine 0.12% Liquid 10 milliLiter(s) Swish and Spit once  chlorhexidine 4% Liquid 1 Application(s) Topical once  chlorhexidine 4% Liquid 1 Application(s) Topical once  chlorhexidine 4% Liquid 1 Application(s) Topical once  dextrose 5%. 1000 milliLiter(s) (50 mL/Hr) IV Continuous <Continuous>  heparin  Infusion.  Unit(s)/Hr (7 mL/Hr) IV Continuous <Continuous>  influenza   Vaccine 0.5 milliLiter(s) IntraMuscular once  insulin lispro (HumaLOG) corrective regimen sliding scale   SubCutaneous Before meals and at bedtime  insulin lispro Injectable (HumaLOG) 3 Unit(s) SubCutaneous before dinner  metoprolol succinate ER 25 milliGRAM(s) Oral once  nitroglycerin  Infusion 5 MICROgram(s)/Min (1.5 mL/Hr) IV Continuous <Continuous>  sodium chloride 0.9%. 500 milliLiter(s) (75 mL/Hr) IV Continuous <Continuous>  sodium chloride 0.9%. 500 milliLiter(s) (50 mL/Hr) IV Continuous <Continuous>    MEDICATIONS  (PRN):  heparin  Injectable 3500 Unit(s) IV Push every 6 hours PRN For aPTT less than 40      LABS                        9.8    6.1   )-----------( 188      ( 31 Oct 2018 07:06 )             29.8     10-    136  |  100  |  27<H>  ----------------------------<  247<H>  4.8   |  24  |  1.40<H>    Ca    10.1      31 Oct 2018 10:59  Mg     1.8     10-31    TPro  6.8  /  Alb  3.9  /  TBili  0.3  /  DBili  x   /  AST  17  /  ALT  15  /  AlkPhos  55  10-31    LIVER FUNCTIONS - ( 31 Oct 2018 10:59 )  Alb: 3.9 g/dL / Pro: 6.8 g/dL / ALK PHOS: 55 U/L / ALT: 15 U/L / AST: 17 U/L / GGT: x           PT/INR - ( 31 Oct 2018 10:59 )   PT: 11.5 sec;   INR: 1.02          PTT - ( 31 Oct 2018 10:59 )  PTT:47.5 sec  Urinalysis Basic - ( 31 Oct 2018 13:11 )    Color: Yellow / Appearance: Clear / S.020 / pH: x  Gluc: x / Ketone: NEGATIVE  / Bili: Negative / Urobili: 0.2 E.U./dL   Blood: x / Protein: Trace mg/dL / Nitrite: NEGATIVE   Leuk Esterase: NEGATIVE / RBC: < 5 /HPF / WBC < 5 /HPF   Sq Epi: x / Non Sq Epi: Moderate /HPF / Bacteria: Present /HPF      CARDIAC MARKERS ( 30 Oct 2018 11:02 )  x     / 0.03 ng/mL / x     / x     / x      CARDIAC MARKERS ( 30 Oct 2018 05:51 )  x     / 0.03 ng/mL / 76 U/L / x     / 2.0 ng/mL  CARDIAC MARKERS ( 29 Oct 2018 19:43 )  x     / <0.01 ng/mL / 81 U/L / x     / 1.7 ng/mL        Radiology and other tests: Reviewed

## 2018-10-31 NOTE — PROGRESS NOTE ADULT - PROBLEM SELECTOR PLAN 4
- A1C 8.3  - Endo consulted continue FSs and MISS and discharge on metformin 1000mg BID. - As per Dr. Mortensen - no need for Lantus this HS as pt to go for CABG tomorrow and ordered 3 U lispro before dinner x1.
-A1C 8.3  -Endo consulted continue FSs and MISS and discharge on metformin 1000mg BID. To f/u with Dr. Mortensen as outpatient
US carotid duplex revealed no evidence of hemodynamically significant right carotid stenosis, 70% or greater diameter stenosis of the left internal carotid artery - CT surgery aware  - will f/u recs

## 2018-10-31 NOTE — PROGRESS NOTE ADULT - SUBJECTIVE AND OBJECTIVE BOX
Pt has significant LM disease  and Three Vessel CAD on Cath   Pt needs CABS        PAST MEDICAL & SURGICAL HISTORY:  HLD (hyperlipidemia)  CKD (chronic kidney disease) stage 3, GFR 30-59 ml/min  HTN (hypertension)  DM (diabetes mellitus)  H/O eye surgery  H/O tubal ligation    MEDICATIONS  (STANDING):  aspirin enteric coated 81 milliGRAM(s) Oral daily  atorvastatin 20 milliGRAM(s) Oral at bedtime  dextrose 5%. 1000 milliLiter(s) (50 mL/Hr) IV Continuous <Continuous>  dextrose 50% Injectable 12.5 Gram(s) IV Push once  dextrose 50% Injectable 25 Gram(s) IV Push once  dextrose 50% Injectable 25 Gram(s) IV Push once  heparin  Injectable 5000 Unit(s) SubCutaneous every 8 hours  influenza   Vaccine 0.5 milliLiter(s) IntraMuscular once  insulin lispro (HumaLOG) corrective regimen sliding scale   SubCutaneous Before meals and at bedtime  magnesium oxide 800 milliGRAM(s) Oral once  metoprolol succinate ER 25 milliGRAM(s) Oral daily  sodium chloride 0.9%. 500 milliLiter(s) (75 mL/Hr) IV Continuous <Continuous>    MEDICATIONS  (PRN):  dextrose 40% Gel 15 Gram(s) Oral once PRN Blood Glucose LESS THAN 70 milliGRAM(s)/deciliter  glucagon  Injectable 1 milliGRAM(s) IntraMuscular once PRN Glucose LESS THAN 70 milligrams/deciliter      lungs clear  CV s1 s2  Abd soft  Ex t stable    CXR neg                        9.8    6.1   )-----------( 188      ( 31 Oct 2018 07:06 )             29.8   10-31    139  |  102  |  29<H>  ----------------------------<  158<H>  4.7   |  21<L>  |  1.36<H>    Ca    10.0      31 Oct 2018 07:05  Mg     1.8     10-31    TPro  7.7  /  Alb  4.3  /  TBili  0.4  /  DBili  x   /  AST  21  /  ALT  20  /  AlkPhos  63  10-29

## 2018-10-31 NOTE — PROGRESS NOTE ADULT - PROBLEM SELECTOR PROBLEM 5
CKD (chronic kidney disease) stage 3, GFR 30-59 ml/min
CKD (chronic kidney disease) stage 3, GFR 30-59 ml/min
HLD (hyperlipidemia)

## 2018-10-31 NOTE — PROGRESS NOTE ADULT - ASSESSMENT
Patient is a 67 y/o female with PMHx of HTN, HLD, DM II, CKD stage 3 (unknown baseline Cr) who is s/p diagnostic cath revealing 3VCAD, Dr. Burnett consulted with plan for CABG on 11/1/18 and undergoing pre-op testing, course c/b left shoulder pain with new TWI in V4-V6 not present on initial ECG and heparin gtt started, pt also note dto be hypertensive to 170s 10/31 AM and nitro gtt started with goal 130s-140s. NPO after MN for CABG.

## 2018-10-31 NOTE — DIETITIAN INITIAL EVALUATION ADULT. - PROBLEM SELECTOR PLAN 5
-Cr 1.3 on admission, unknown baseline  -hydrate pre-cath in AM  -monitor Cr    DVT ppx: SQH  Dispo: NPO for cath in AM    Case d/w Dr. Tena

## 2018-10-31 NOTE — PROGRESS NOTE ADULT - PROBLEM SELECTOR PLAN 5
- Cr 1.3 on admission, unknown baseline.   - Cr 1.34>1.29>1.36> 1.44  - f/u repeat BMP at 6PM  - Plan for gentle hydration overnight as d/w CTS team and f/u AM BMP    DVT ppx: heparin gtt  Dispo: Pending CABG tomorrow, NPO after MN    Case d/w Dr. Tena and CTS team
-Cr 1.3 on admission, unknown baseline. This AM 1.2  -monitor Cr    DVT ppx: [facility]  Dispo: CTS consulted f/u recs    Case d/w Dr. Tena
LDL 75, HDL 32, total cholesterol 134. Home dose lipitor 20  - c/w lipitor 40 mg PO QD

## 2018-10-31 NOTE — PROGRESS NOTE ADULT - PROBLEM SELECTOR PLAN 1
-CP free, EKG with no ischemic changes, Trop x1 0.02 x2 negative x3 0.03 x4 0.03  -CTA coronaries 10/29: Ca score moderately elevated at 366, severe stenosis due to calcific and noncalcific plaque pLAD, D1, mLCx, mRCA and dRCA  -s/p diagnostic cath 10/30 revealing severe 3VCAD, Dr. Burnett consulted f/u recs  -continue ASA, NO plavix per CTS  -ECHO ordered f/u
Coronary CTA 10/29: Ca score moderately elevated at 366, severe stenosis due to calcific and noncalcific plaque pLAD, D1, mLCx, mRCA and dRCA  - s/p diagnostic cath 10/30 revealing severe 3VCAD  - pt for CABG tomorrow w/ CTS  - NPO after midnight  - monitor for signs of worsening ischemia  - c/w ASA 81  - holding plavix and ACE-i for sx; may restart after as tolerated
Currently with left shoulder pain this AM and hypertensive to 170s.  - Trop 0.02>neg>0.03>0.03  - Repeat ECG on 10/31/18 revealed NSR @ 68 BPM with TWI in I AVL and new TWI in V4-V6 not present on admission.   - CCTA 10/29: Ca score moderately elevated at 366, severe stenosis due to calcific and noncalcific plaque pLAD, D1, mLCx, mRCA and dRCA  -s/p diagnostic cath 10/30 revealing severe 3VCAD  - ECHO revealed LA mildly dilated, mild aortic valve thickening, no hemodynamically significant valvular AS, mild mitral valve thickening , mild mitral annular calcification, trace to mild mitral regurgitation., PASP 29 mmHg,  mild concentric LVH, basal inferior wall hypokinesis,  other walls contract normally, LVEF 50-55%  - US carotid duplex revealed no evidence of hemodynamically significant right carotid stenosis, 70% or greater diameter stenosis of the left internal carotid artery (ALERTED CTS TEAM)  - Pending PFTs, was going to go off floor for PA/Lateral CXR but this was changed to portable given active chest pain and ECG changes.   - T+S x1 complete. 2nd T+S to be drawn.  - Started on nitro gtt with SBP goal 130s and heparin gtt initiated   - Continue ASA 81 mg PO QD, Toprol XL 25 mg PO QD uptitrated to 50 mg PO QD  - NPO after MN for CABG

## 2018-10-31 NOTE — PROGRESS NOTE ADULT - SUBJECTIVE AND OBJECTIVE BOX
INTERVAL HPI/OVERNIGHT EVENTS:    Patient is a 68y old  Female who presents with a chief complaint of chest pain  She underwent coronary cath yesterday which was notable for extensive disease, now tentatively planned for CABG tomorrow.   She had FSG of 214 last night, declined coverage, awoke this morning with  and was given 1 unit.   Pt feels well this morning.       Pt reports the following symptoms:    CONSTITUTIONAL:  Negative fever or chills, feels well, good appetite  EYES:  Negative  blurry vision or double vision  CARDIOVASCULAR:  Negative for chest pain or palpitations  RESPIRATORY:  Negative for cough, wheezing, or SOB   GASTROINTESTINAL:  Negative for nausea, vomiting, diarrhea, constipation, or abdominal pain  GENITOURINARY:  Negative frequency, urgency or dysuria  NEUROLOGIC:  No headache, confusion, dizziness, lightheadedness    MEDICATIONS  (STANDING):  aspirin enteric coated 81 milliGRAM(s) Oral daily  atorvastatin 20 milliGRAM(s) Oral at bedtime  dextrose 5%. 1000 milliLiter(s) (50 mL/Hr) IV Continuous <Continuous>  dextrose 50% Injectable 12.5 Gram(s) IV Push once  dextrose 50% Injectable 25 Gram(s) IV Push once  dextrose 50% Injectable 25 Gram(s) IV Push once  heparin  Infusion.  Unit(s)/Hr (7 mL/Hr) IV Continuous <Continuous>  influenza   Vaccine 0.5 milliLiter(s) IntraMuscular once  insulin lispro (HumaLOG) corrective regimen sliding scale   SubCutaneous Before meals and at bedtime  magnesium oxide 800 milliGRAM(s) Oral once  metoprolol succinate ER 25 milliGRAM(s) Oral daily  sodium chloride 0.9%. 500 milliLiter(s) (75 mL/Hr) IV Continuous <Continuous>    MEDICATIONS  (PRN):  dextrose 40% Gel 15 Gram(s) Oral once PRN Blood Glucose LESS THAN 70 milliGRAM(s)/deciliter  glucagon  Injectable 1 milliGRAM(s) IntraMuscular once PRN Glucose LESS THAN 70 milligrams/deciliter  heparin  Injectable 3500 Unit(s) IV Push every 6 hours PRN For aPTT less than 40      PHYSICAL EXAM  Vital Signs Last 24 Hrs  T(C): 36.5 (31 Oct 2018 09:42), Max: 36.8 (31 Oct 2018 00:34)  T(F): 97.7 (31 Oct 2018 09:42), Max: 98.3 (31 Oct 2018 00:34)  HR: 70 (31 Oct 2018 05:20) (58 - 70)  BP: 138/62 (31 Oct 2018 05:20) (130/62 - 185/76)  BP(mean): --  RR: 17 (31 Oct 2018 05:20) (16 - 18)  SpO2: 99% (31 Oct 2018 05:20) (96% - 100%)    Constitutional: wn/wd in NAD.   HEENT: NCAT, MMM, OP clear, EOMI, no proptosis or lid retraction  Neck: no thyromegaly or palpable thyroid nodules   Respiratory: lungs CTAB.  Cardiovascular: regular rhythm, normal S1 and S2, no audible murmurs, no peripheral edema  GI: soft, NT/ND, no masses/HSM appreciated.  Neurology: no tremors, DTR 2+  Skin: no visible rashes/lesions  Psychiatric: AAO x 3, normal affect/mood.    LABS:                        9.8    6.1   )-----------( 188      ( 31 Oct 2018 07:06 )             29.8     10-31    139  |  102  |  29<H>  ----------------------------<  158<H>  4.7   |  21<L>  |  1.36<H>    Ca    10.0      31 Oct 2018 07:05  Mg     1.8     10-31    TPro  7.7  /  Alb  4.3  /  TBili  0.4  /  DBili  x   /  AST  21  /  ALT  20  /  AlkPhos  63  10-29    PT/INR - ( 29 Oct 2018 12:45 )   PT: 11.4 sec;   INR: 1.03          PTT - ( 29 Oct 2018 12:45 )  PTT:27.1 sec    Thyroid Stimulating Hormone, Serum: 1.866 uIU/mL (10-30 @ 05:51)      HbA1C: 8.3 % (10-30 @ 05:51)    CAPILLARY BLOOD GLUCOSE      POCT Blood Glucose.: 167 mg/dL (31 Oct 2018 06:48)  POCT Blood Glucose.: 214 mg/dL (30 Oct 2018 21:30)  POCT Blood Glucose.: 95 mg/dL (30 Oct 2018 16:44)  POCT Blood Glucose.: 110 mg/dL (30 Oct 2018 11:40)      Insulin Sliding Scale requirements X 24 Hours:    A/P: 68y Female with history of DM type II presenting for chest pain, found to have significant CAD on cardiac cath, now planned for CABG this admission.     1.  DM - type 2, uncontrolled, exacerbated by home diet.   No need for standing insulin given appropriate AM fasting sugar today and NPO status likely for tomorrow.   Please continue moderate dose sliding scale and consistent carbohydrate diet.   Pt will need intensive glucose control post-operately and will likely require insulin infusion.     Goal FSG is 100-180  Will continue to monitor INTERVAL HPI/OVERNIGHT EVENTS:    Patient is a 68y old  Female who presents with a chief complaint of chest pain  She underwent coronary cath yesterday which was notable for extensive disease, now tentatively planned for CABG tomorrow.   She had FSG of 214 last night, declined coverage, awoke this morning with  and was given 1 unit.   Pt feels well this morning.   She is started on a nitroglycerin infusion for control of her elevated BP.   She reports she ate dinner last night, but not the whole meal and again had part of her breakfast this morning.   continues to have some left shoulder/chest pain,       Pt reports the following symptoms:    CONSTITUTIONAL:  Negative fever or chills, feels well, good appetite  EYES:  Negative  blurry vision or double vision  CARDIOVASCULAR:  Negative for chest pain or palpitations  RESPIRATORY:  Negative for cough, wheezing, or SOB   GASTROINTESTINAL:  Negative for nausea, vomiting, diarrhea, constipation, or abdominal pain  GENITOURINARY:  Negative frequency, urgency or dysuria  NEUROLOGIC:  No headache, confusion, dizziness, lightheadedness    MEDICATIONS  (STANDING):  aspirin enteric coated 81 milliGRAM(s) Oral daily  atorvastatin 20 milliGRAM(s) Oral at bedtime  dextrose 5%. 1000 milliLiter(s) (50 mL/Hr) IV Continuous <Continuous>  dextrose 50% Injectable 12.5 Gram(s) IV Push once  dextrose 50% Injectable 25 Gram(s) IV Push once  dextrose 50% Injectable 25 Gram(s) IV Push once  heparin  Infusion.  Unit(s)/Hr (7 mL/Hr) IV Continuous <Continuous>  influenza   Vaccine 0.5 milliLiter(s) IntraMuscular once  insulin lispro (HumaLOG) corrective regimen sliding scale   SubCutaneous Before meals and at bedtime  magnesium oxide 800 milliGRAM(s) Oral once  metoprolol succinate ER 25 milliGRAM(s) Oral daily  sodium chloride 0.9%. 500 milliLiter(s) (75 mL/Hr) IV Continuous <Continuous>    MEDICATIONS  (PRN):  dextrose 40% Gel 15 Gram(s) Oral once PRN Blood Glucose LESS THAN 70 milliGRAM(s)/deciliter  glucagon  Injectable 1 milliGRAM(s) IntraMuscular once PRN Glucose LESS THAN 70 milligrams/deciliter  heparin  Injectable 3500 Unit(s) IV Push every 6 hours PRN For aPTT less than 40      PHYSICAL EXAM  Vital Signs Last 24 Hrs  T(C): 36.5 (31 Oct 2018 09:42), Max: 36.8 (31 Oct 2018 00:34)  T(F): 97.7 (31 Oct 2018 09:42), Max: 98.3 (31 Oct 2018 00:34)  HR: 70 (31 Oct 2018 05:20) (58 - 70)  BP: 138/62 (31 Oct 2018 05:20) (130/62 - 185/76)  BP(mean): --  RR: 17 (31 Oct 2018 05:20) (16 - 18)  SpO2: 99% (31 Oct 2018 05:20) (96% - 100%)    Constitutional: wn/wd in NAD.   HEENT: NCAT, MMM, OP clear, EOMI, no proptosis or lid retraction  Neck: no thyromegaly or palpable thyroid nodules   Respiratory: lungs CTAB.  Cardiovascular: regular rhythm, normal S1 and S2, no audible murmurs, no peripheral edema  GI: soft, NT/ND, no masses/HSM appreciated.  Neurology: no tremors, DTR 2+  Skin: no visible rashes/lesions  Psychiatric: AAO x 3, normal affect/mood.    LABS:                        9.8    6.1   )-----------( 188      ( 31 Oct 2018 07:06 )             29.8     10-31    139  |  102  |  29<H>  ----------------------------<  158<H>  4.7   |  21<L>  |  1.36<H>    Ca    10.0      31 Oct 2018 07:05  Mg     1.8     10-31    TPro  7.7  /  Alb  4.3  /  TBili  0.4  /  DBili  x   /  AST  21  /  ALT  20  /  AlkPhos  63  10-29    PT/INR - ( 29 Oct 2018 12:45 )   PT: 11.4 sec;   INR: 1.03          PTT - ( 29 Oct 2018 12:45 )  PTT:27.1 sec    Thyroid Stimulating Hormone, Serum: 1.866 uIU/mL (10-30 @ 05:51)      HbA1C: 8.3 % (10-30 @ 05:51)    CAPILLARY BLOOD GLUCOSE    POCT Blood Glucose.: 167 mg/dL (31 Oct 2018 06:48)  POCT Blood Glucose.: 214 mg/dL (30 Oct 2018 21:30)  POCT Blood Glucose.: 95 mg/dL (30 Oct 2018 16:44)  POCT Blood Glucose.: 110 mg/dL (30 Oct 2018 11:40)

## 2018-10-31 NOTE — PROGRESS NOTE ADULT - PROBLEM SELECTOR PLAN 7
Cr 1.3 on admission, unknown baseline.   - Cr 1.4 today s/p cath  - f/u repeat BMP at 6PM  - C/w gentle hydration   - continue to monitor

## 2018-10-31 NOTE — PROGRESS NOTE ADULT - ASSESSMENT
Please continue heparin and maintain PTT 60-80, titrate nitro gtt for BP 140s for hx ELIUD stenosis.  Please hydrate overnight with 50cc/hr NS x 12 hours. f/u AM Cr. Please continue heparin and maintain PTT 60-80, titrate nitro gtt for BP 140s for hx ELIUD stenosis.  Please hydrate overnight with 50cc/hr NS x 12 hours. F/u BMP tonight, and f/u AM Cr.

## 2018-10-31 NOTE — DIETITIAN INITIAL EVALUATION ADULT. - ENERGY NEEDS
Height: 5'1" Weight: 132.7lbs, IBW 105lbs+/-10%, %%, BMI 25.1,  IBW used to calculate EER as per pt's current body weight >120% of IBW  Estimated nutrient needs based on [facility] SOC for maintenance in older adults adjusted for pre-op

## 2018-10-31 NOTE — PROGRESS NOTE ADULT - SUBJECTIVE AND OBJECTIVE BOX
Interventional Cardiology PA Adult Progress Note    CC: SSCP radiating to left shoulder upon admit    Subjective Assessment:  Pt seen and examined at bedside. Pt reports 8/10 left shoulder pain described as sharp. Pt reports this pain has been coming and going over past 1 month. Denies dizziness, SOB, palpitations, N/V, abdominal pain. All other 12 point review of systems otherwise negative except per subjective.   	  MEDICATIONS:  metoprolol succinate ER 25 milliGRAM(s) Oral daily  nitroglycerin  Infusion 5 MICROgram(s)/Min IV Continuous <Continuous>  atorvastatin 20 milliGRAM(s) Oral at bedtime  insulin lispro (HumaLOG) corrective regimen sliding scale   SubCutaneous Before meals and at bedtime  insulin lispro Injectable (HumaLOG) 3 Unit(s) SubCutaneous before dinner  aspirin enteric coated 81 milliGRAM(s) Oral daily  chlorhexidine 0.12% Liquid 10 milliLiter(s) Swish and Spit once  chlorhexidine 4% Liquid 1 Application(s) Topical once  chlorhexidine 4% Liquid 1 Application(s) Topical once  chlorhexidine 4% Liquid 1 Application(s) Topical once  dextrose 5%. 1000 milliLiter(s) IV Continuous <Continuous>  heparin  Infusion.  Unit(s)/Hr IV Continuous <Continuous>  heparin  Injectable 3500 Unit(s) IV Push every 6 hours PRN  influenza   Vaccine 0.5 milliLiter(s) IntraMuscular once  sodium chloride 0.9%. 500 milliLiter(s) IV Continuous <Continuous>        [PHYSICAL EXAM:  TELEMETRY:  T(C): 36.6 (10-31-18 @ 13:42), Max: 36.8 (10-31-18 @ 00:34)  HR: 68 (10-31-18 @ 13:04) (60 - 70)BP: 144/67 (10-31-18 @ 13:04) (130/62 - 185/76)  RR: 18 (10-31-18 @ 13:04) (16 - 18)  SpO2: 99% (10-31-18 @ 05:20) (99% - 100%)  Wt(kg): --  I&O's Summary    30 Oct 2018 07:01  -  31 Oct 2018 07:00  --------------------------------------------------------  IN: 705 mL / OUT: 0 mL / NET: 705 mL    31 Oct 2018 07:01  -  31 Oct 2018 14:41  --------------------------------------------------------  IN: 596 mL / OUT: 0 mL / NET: 596 mL      Height (cm): 154.94 (10-30 @ 18:20)  Weight (kg): 60.2 (10-30 @ 18:20)  BMI (kg/m2): 25.1 (10-30 @ 18:20)  BSA (m2): 1.59 (10-30 @ 18:20)  Ortiz:  Central/PICC/Mid Line:                                         Appearance: WD/WN sitting in hospital bed in distress 2/2 pain	  HEENT:   Normal oral mucosa, EOMI	  Neck: - JVD  Cardiovascular: Normal S1 S2, No murmurs, pain worsened with palpation  Respiratory: Lungs clear to auscultation, no w/r/r  Gastrointestinal:  Soft, Non-tender, + BS	  Skin: No rashes, No ecchymoses, No cyanosis  Extremities: no c/c/e  Vascular:  DP/PT faint B/L, radial 2+ B/L  Neurologic: Non-focal  Psychiatry: A & O x 3, Mood & affect appropriate                          9.8    6.1   )-----------( 188      ( 31 Oct 2018 07:06 )             29.8     10-31    136  |  100  |  27<H>  ----------------------------<  247<H>  4.8   |  24  |  1.40<H>    Ca    10.1      31 Oct 2018 10:59  Mg     1.8     10-31    TPro  6.8  /  Alb  3.9  /  TBili  0.3  /  DBili  x   /  AST  17  /  ALT  15  /  AlkPhos  55  10-31  proBNP: Serum Pro-Brain Natriuretic Peptide: 2329 pg/mL (10-31 @ 10:59)   PT/INR - ( 31 Oct 2018 10:59 )   PT: 11.5 sec;   INR: 1.02     PTT - ( 31 Oct 2018 10:59 )  PTT:47.5 sec Interventional Cardiology PA Adult Progress Note    Hospital course:  67 y/o female (visiting from [facility]) with FHx of CAD PMHx of silent MI (medically managed), HTN, HLD, DM II, CKD stage III (unknown baseline Cr) who presented to [facility] ED on 10/29/2018 c/o stabbing chest pain (8/10 severity) that radiates to left shoulder that started last night which is associated with SOB and nausea. Patient states she has had intermittent episodes of chest pain for the past 6 months, but this episode was much worse than what she had experienced in previous episodes. In addition pt reports that for the last year she has experienced JAFFE upon ambulating 5-10 minutes that is subsequently resolved with rest. Pt denies palpitations, syncope, PND/orthopnea or LE edema, recent illness or trauma to chest, fever or chills. In ED VS /73, HR 64, RR 17, O2 100%, Temp 98.9. EKG no ischemic changes. CXR unremarkable Labs Trop x1 0.02, Cr. 1.3 CTA coronaries Ca score moderately elevated at 366, severe stenosis due to calcific and noncalcific plaque pLAD, D1, mLCx, mRCA and dRCA. In ED pt given IVF NS bolus x2, Lidocaine 2% 10mL POx1, Lopressor 50mg POx1, IV Zofran 4mg x1, Pepcid 20mg POx1, ASA 325mg POx1 and Maalox 30mL POx1. Upon examination pt currently CP free. Diagnostic cath completed on 10/30 revealing 3VD: 50% oLM lesion, 90-95% pLAD lesion, 90% D1 lesion, 80% pLCx lesion, 90% OM1 lesion, 100% mRCA lesion with L-R collaterals, EF:55%, EDP 17mmHg. R radial. CTS Dr. Burnett consulted with plan for CABG on 11/1/18. Course c/b shoulder pain described as sharp and of 8/10 intensity with new TWI in V4-V6 but left shoulder pain resolved with initiation and uptitration of nitro gtt. Pt also found to be hypertensive to 170s 10/31 AM with improvement to 140s after uptitration of nitro gtt. Heparin gtt started for AC given new TWI on ECG. Majority of pre-op testing complete other than CXR and PFTs (spoke with both teams who will complete today). Creatinine noted 1.34>1.29>1.36>1.4 with plan for recheck BMP at 6PM and gentle hydration overnight.  Last T + S to be sent at 6PM as well. Endocrinology consulted for A1C 8.3 recommending lispro at dinner and no lantus tonight as pt NPO After MN for CABG. As per d/w Dr. Burnett, in light of severe 3VCAD, pt recommended for transfer to higher level of care with A-line and appropriate uptitration of nitro gtt for hypertensive management.     CC: SSCP radiating to left shoulder upon admit    Subjective Assessment:  Pt seen and examined at bedside. Pt reports 8/10 left shoulder pain described as sharp. Pt reports this pain has been coming and going over past 1 month. Denies dizziness, SOB, palpitations, N/V, abdominal pain. All other 12 point review of systems otherwise negative except per subjective.   	  MEDICATIONS:  metoprolol succinate ER 25 milliGRAM(s) Oral daily  nitroglycerin  Infusion 5 MICROgram(s)/Min IV Continuous <Continuous>  atorvastatin 20 milliGRAM(s) Oral at bedtime  insulin lispro (HumaLOG) corrective regimen sliding scale   SubCutaneous Before meals and at bedtime  insulin lispro Injectable (HumaLOG) 3 Unit(s) SubCutaneous before dinner  aspirin enteric coated 81 milliGRAM(s) Oral daily  chlorhexidine 0.12% Liquid 10 milliLiter(s) Swish and Spit once  chlorhexidine 4% Liquid 1 Application(s) Topical once  chlorhexidine 4% Liquid 1 Application(s) Topical once  chlorhexidine 4% Liquid 1 Application(s) Topical once  dextrose 5%. 1000 milliLiter(s) IV Continuous <Continuous>  heparin  Infusion.  Unit(s)/Hr IV Continuous <Continuous>  heparin  Injectable 3500 Unit(s) IV Push every 6 hours PRN  influenza   Vaccine 0.5 milliLiter(s) IntraMuscular once  sodium chloride 0.9%. 500 milliLiter(s) IV Continuous <Continuous>        [PHYSICAL EXAM:  TELEMETRY:  T(C): 36.6 (10-31-18 @ 13:42), Max: 36.8 (10-31-18 @ 00:34)  HR: 68 (10-31-18 @ 13:04) (60 - 70)BP: 144/67 (10-31-18 @ 13:04) (130/62 - 185/76)  RR: 18 (10-31-18 @ 13:04) (16 - 18)  SpO2: 99% (10-31-18 @ 05:20) (99% - 100%)  Wt(kg): --  I&O's Summary    30 Oct 2018 07:01  -  31 Oct 2018 07:00  --------------------------------------------------------  IN: 705 mL / OUT: 0 mL / NET: 705 mL    31 Oct 2018 07:01  -  31 Oct 2018 14:41  --------------------------------------------------------  IN: 596 mL / OUT: 0 mL / NET: 596 mL      Height (cm): 154.94 (10-30 @ 18:20)  Weight (kg): 60.2 (10-30 @ 18:20)  BMI (kg/m2): 25.1 (10-30 @ 18:20)  BSA (m2): 1.59 (10-30 @ 18:20)  Ortiz:  Central/PICC/Mid Line:                                         Appearance: WD/WN sitting in hospital bed in distress 2/2 pain	  HEENT:   Normal oral mucosa, EOMI	  Neck: - JVD  Cardiovascular: Normal S1 S2, No murmurs, pain worsened with palpation  Respiratory: Lungs clear to auscultation, no w/r/r  Gastrointestinal:  Soft, Non-tender, + BS	  Skin: No rashes, No ecchymoses, No cyanosis  Extremities: no c/c/e  Vascular:  DP/PT faint B/L, radial 2+ B/L  Neurologic: Non-focal  Psychiatry: A & O x 3, Mood & affect appropriate                          9.8    6.1   )-----------( 188      ( 31 Oct 2018 07:06 )             29.8     10-31    136  |  100  |  27<H>  ----------------------------<  247<H>  4.8   |  24  |  1.40<H>    Ca    10.1      31 Oct 2018 10:59  Mg     1.8     10-31    TPro  6.8  /  Alb  3.9  /  TBili  0.3  /  DBili  x   /  AST  17  /  ALT  15  /  AlkPhos  55  10-31  proBNP: Serum Pro-Brain Natriuretic Peptide: 2329 pg/mL (10-31 @ 10:59)   PT/INR - ( 31 Oct 2018 10:59 )   PT: 11.5 sec;   INR: 1.02     PTT - ( 31 Oct 2018 10:59 )  PTT:47.5 sec Interventional Cardiology PA Adult TRANSFER Note    Hospital course:  67 y/o female (visiting from [facility]) with FHx of CAD PMHx of silent MI (medically managed), HTN, HLD, DM II, CKD stage III (unknown baseline Cr) who presented to [facility] ED on 10/29/2018 c/o stabbing chest pain (8/10 severity) that radiates to left shoulder that started last night which is associated with SOB and nausea. Patient states she has had intermittent episodes of chest pain for the past 6 months, but this episode was much worse than what she had experienced in previous episodes. In addition pt reports that for the last year she has experienced JAFFE upon ambulating 5-10 minutes that is subsequently resolved with rest. Pt denies palpitations, syncope, PND/orthopnea or LE edema, recent illness or trauma to chest, fever or chills. In ED VS /73, HR 64, RR 17, O2 100%, Temp 98.9. EKG no ischemic changes. CXR unremarkable Labs Trop x1 0.02, Cr. 1.3 CTA coronaries Ca score moderately elevated at 366, severe stenosis due to calcific and noncalcific plaque pLAD, D1, mLCx, mRCA and dRCA. In ED pt given IVF NS bolus x2, Lidocaine 2% 10mL POx1, Lopressor 50mg POx1, IV Zofran 4mg x1, Pepcid 20mg POx1, ASA 325mg POx1 and Maalox 30mL POx1. Upon examination pt currently CP free. Diagnostic cath completed on 10/30 revealing 3VD: 50% oLM lesion, 90-95% pLAD lesion, 90% D1 lesion, 80% pLCx lesion, 90% OM1 lesion, 100% mRCA lesion with L-R collaterals, EF:55%, EDP 17mmHg. R radial. CTS Dr. Burnett consulted with plan for CABG on 11/1/18. Course c/b shoulder pain described as sharp and of 8/10 intensity with new TWI in V4-V6 but left shoulder pain resolved with initiation and uptitration of nitro gtt. Pt also found to be hypertensive to 170s 10/31 AM with improvement to 140s after uptitration of nitro gtt. Heparin gtt started for AC given new TWI on ECG. Majority of pre-op testing complete other than CXR and PFTs (spoke with both teams who will complete today). Creatinine noted 1.34>1.29>1.36>1.4 with plan for recheck BMP at 6PM and gentle hydration overnight.  Last T + S to be sent at 6PM as well. Endocrinology consulted for A1C 8.3 recommending lispro at dinner and no lantus tonight as pt NPO After MN for CABG. As per d/w Dr. Burnett, in light of severe 3VCAD, pt recommended for transfer to higher level of care with A-line and appropriate uptitration of nitro gtt for hypertensive management.     CC: SSCP radiating to left shoulder upon admit    Subjective Assessment:  Pt seen and examined at bedside. Pt reports 8/10 left shoulder pain described as sharp. Pt reports this pain has been coming and going over past 1 month. Denies dizziness, SOB, palpitations, N/V, abdominal pain. All other 12 point review of systems otherwise negative except per subjective.   	  MEDICATIONS:  metoprolol succinate ER 25 milliGRAM(s) Oral daily  nitroglycerin  Infusion 5 MICROgram(s)/Min IV Continuous <Continuous>  atorvastatin 20 milliGRAM(s) Oral at bedtime  insulin lispro (HumaLOG) corrective regimen sliding scale   SubCutaneous Before meals and at bedtime  insulin lispro Injectable (HumaLOG) 3 Unit(s) SubCutaneous before dinner  aspirin enteric coated 81 milliGRAM(s) Oral daily  chlorhexidine 0.12% Liquid 10 milliLiter(s) Swish and Spit once  chlorhexidine 4% Liquid 1 Application(s) Topical once  chlorhexidine 4% Liquid 1 Application(s) Topical once  chlorhexidine 4% Liquid 1 Application(s) Topical once  dextrose 5%. 1000 milliLiter(s) IV Continuous <Continuous>  heparin  Infusion.  Unit(s)/Hr IV Continuous <Continuous>  heparin  Injectable 3500 Unit(s) IV Push every 6 hours PRN  influenza   Vaccine 0.5 milliLiter(s) IntraMuscular once  sodium chloride 0.9%. 500 milliLiter(s) IV Continuous <Continuous>        [PHYSICAL EXAM:  TELEMETRY:  T(C): 36.6 (10-31-18 @ 13:42), Max: 36.8 (10-31-18 @ 00:34)  HR: 68 (10-31-18 @ 13:04) (60 - 70)BP: 144/67 (10-31-18 @ 13:04) (130/62 - 185/76)  RR: 18 (10-31-18 @ 13:04) (16 - 18)  SpO2: 99% (10-31-18 @ 05:20) (99% - 100%)  Wt(kg): --  I&O's Summary    30 Oct 2018 07:01  -  31 Oct 2018 07:00  --------------------------------------------------------  IN: 705 mL / OUT: 0 mL / NET: 705 mL    31 Oct 2018 07:01  -  31 Oct 2018 14:41  --------------------------------------------------------  IN: 596 mL / OUT: 0 mL / NET: 596 mL      Height (cm): 154.94 (10-30 @ 18:20)  Weight (kg): 60.2 (10-30 @ 18:20)  BMI (kg/m2): 25.1 (10-30 @ 18:20)  BSA (m2): 1.59 (10-30 @ 18:20)  Ortiz:  Central/PICC/Mid Line:                                         Appearance: WD/WN sitting in hospital bed in distress 2/2 pain	  HEENT:   Normal oral mucosa, EOMI	  Neck: - JVD  Cardiovascular: Normal S1 S2, No murmurs, pain worsened with palpation  Respiratory: Lungs clear to auscultation, no w/r/r  Gastrointestinal:  Soft, Non-tender, + BS	  Skin: No rashes, No ecchymoses, No cyanosis  Extremities: no c/c/e  Vascular:  DP/PT faint B/L, radial 2+ B/L  Neurologic: Non-focal  Psychiatry: A & O x 3, Mood & affect appropriate                          9.8    6.1   )-----------( 188      ( 31 Oct 2018 07:06 )             29.8     10-31    136  |  100  |  27<H>  ----------------------------<  247<H>  4.8   |  24  |  1.40<H>    Ca    10.1      31 Oct 2018 10:59  Mg     1.8     10-31    TPro  6.8  /  Alb  3.9  /  TBili  0.3  /  DBili  x   /  AST  17  /  ALT  15  /  AlkPhos  55  10-31  proBNP: Serum Pro-Brain Natriuretic Peptide: 2329 pg/mL (10-31 @ 10:59)   PT/INR - ( 31 Oct 2018 10:59 )   PT: 11.5 sec;   INR: 1.02     PTT - ( 31 Oct 2018 10:59 )  PTT:47.5 sec

## 2018-11-01 ENCOUNTER — APPOINTMENT (OUTPATIENT)
Dept: CARDIOTHORACIC SURGERY | Facility: HOSPITAL | Age: 68
End: 2018-11-01

## 2018-11-01 LAB
ALBUMIN SERPL ELPH-MCNC: 2.8 G/DL — LOW (ref 3.3–5)
ALBUMIN SERPL ELPH-MCNC: 3.5 G/DL — SIGNIFICANT CHANGE UP (ref 3.3–5)
ALBUMIN SERPL ELPH-MCNC: 3.7 G/DL — SIGNIFICANT CHANGE UP (ref 3.3–5)
ALBUMIN SERPL ELPH-MCNC: 3.9 G/DL — SIGNIFICANT CHANGE UP (ref 3.3–5)
ALP SERPL-CCNC: 39 U/L — LOW (ref 40–120)
ALP SERPL-CCNC: 39 U/L — LOW (ref 40–120)
ALP SERPL-CCNC: 40 U/L — SIGNIFICANT CHANGE UP (ref 40–120)
ALP SERPL-CCNC: 48 U/L — SIGNIFICANT CHANGE UP (ref 40–120)
ALT FLD-CCNC: 11 U/L — SIGNIFICANT CHANGE UP (ref 10–45)
ALT FLD-CCNC: 11 U/L — SIGNIFICANT CHANGE UP (ref 10–45)
ALT FLD-CCNC: 12 U/L — SIGNIFICANT CHANGE UP (ref 10–45)
ALT FLD-CCNC: 13 U/L — SIGNIFICANT CHANGE UP (ref 10–45)
ANION GAP SERPL CALC-SCNC: 12 MMOL/L — SIGNIFICANT CHANGE UP (ref 5–17)
ANION GAP SERPL CALC-SCNC: 15 MMOL/L — SIGNIFICANT CHANGE UP (ref 5–17)
ANION GAP SERPL CALC-SCNC: 17 MMOL/L — SIGNIFICANT CHANGE UP (ref 5–17)
ANION GAP SERPL CALC-SCNC: 17 MMOL/L — SIGNIFICANT CHANGE UP (ref 5–17)
APTT BLD: 25.6 SEC — LOW (ref 27.5–36.3)
APTT BLD: 27.2 SEC — LOW (ref 27.5–36.3)
APTT BLD: 29.9 SEC — SIGNIFICANT CHANGE UP (ref 27.5–36.3)
APTT BLD: 62.1 SEC — HIGH (ref 27.5–36.3)
APTT BLD: 64.8 SEC — HIGH (ref 27.5–36.3)
AST SERPL-CCNC: 16 U/L — SIGNIFICANT CHANGE UP (ref 10–40)
AST SERPL-CCNC: 42 U/L — HIGH (ref 10–40)
AST SERPL-CCNC: 42 U/L — HIGH (ref 10–40)
AST SERPL-CCNC: 52 U/L — HIGH (ref 10–40)
BASE EXCESS BLDA CALC-SCNC: 0.3 MMOL/L — SIGNIFICANT CHANGE UP (ref -2–3)
BASOPHILS NFR BLD AUTO: 0.1 % — SIGNIFICANT CHANGE UP (ref 0–2)
BILIRUB SERPL-MCNC: 0.3 MG/DL — SIGNIFICANT CHANGE UP (ref 0.2–1.2)
BILIRUB SERPL-MCNC: 0.4 MG/DL — SIGNIFICANT CHANGE UP (ref 0.2–1.2)
BILIRUB SERPL-MCNC: 0.5 MG/DL — SIGNIFICANT CHANGE UP (ref 0.2–1.2)
BILIRUB SERPL-MCNC: 0.7 MG/DL — SIGNIFICANT CHANGE UP (ref 0.2–1.2)
BUN SERPL-MCNC: 19 MG/DL — SIGNIFICANT CHANGE UP (ref 7–23)
BUN SERPL-MCNC: 21 MG/DL — SIGNIFICANT CHANGE UP (ref 7–23)
BUN SERPL-MCNC: 22 MG/DL — SIGNIFICANT CHANGE UP (ref 7–23)
BUN SERPL-MCNC: 31 MG/DL — HIGH (ref 7–23)
CALCIUM SERPL-MCNC: 8.9 MG/DL — SIGNIFICANT CHANGE UP (ref 8.4–10.5)
CALCIUM SERPL-MCNC: 9.1 MG/DL — SIGNIFICANT CHANGE UP (ref 8.4–10.5)
CALCIUM SERPL-MCNC: 9.4 MG/DL — SIGNIFICANT CHANGE UP (ref 8.4–10.5)
CALCIUM SERPL-MCNC: 9.5 MG/DL — SIGNIFICANT CHANGE UP (ref 8.4–10.5)
CHLORIDE SERPL-SCNC: 101 MMOL/L — SIGNIFICANT CHANGE UP (ref 96–108)
CHLORIDE SERPL-SCNC: 101 MMOL/L — SIGNIFICANT CHANGE UP (ref 96–108)
CHLORIDE SERPL-SCNC: 102 MMOL/L — SIGNIFICANT CHANGE UP (ref 96–108)
CHLORIDE SERPL-SCNC: 102 MMOL/L — SIGNIFICANT CHANGE UP (ref 96–108)
CO2 SERPL-SCNC: 20 MMOL/L — LOW (ref 22–31)
CO2 SERPL-SCNC: 20 MMOL/L — LOW (ref 22–31)
CO2 SERPL-SCNC: 21 MMOL/L — LOW (ref 22–31)
CO2 SERPL-SCNC: 27 MMOL/L — SIGNIFICANT CHANGE UP (ref 22–31)
CREAT SERPL-MCNC: 1.12 MG/DL — SIGNIFICANT CHANGE UP (ref 0.5–1.3)
CREAT SERPL-MCNC: 1.24 MG/DL — SIGNIFICANT CHANGE UP (ref 0.5–1.3)
CREAT SERPL-MCNC: 1.25 MG/DL — SIGNIFICANT CHANGE UP (ref 0.5–1.3)
CREAT SERPL-MCNC: 1.5 MG/DL — HIGH (ref 0.5–1.3)
EOSINOPHIL NFR BLD AUTO: 1.7 % — SIGNIFICANT CHANGE UP (ref 0–6)
GAS PNL BLDA: SIGNIFICANT CHANGE UP
GLUCOSE BLDC GLUCOMTR-MCNC: 113 MG/DL — HIGH (ref 70–99)
GLUCOSE BLDC GLUCOMTR-MCNC: 121 MG/DL — HIGH (ref 70–99)
GLUCOSE BLDC GLUCOMTR-MCNC: 131 MG/DL — HIGH (ref 70–99)
GLUCOSE BLDC GLUCOMTR-MCNC: 146 MG/DL — HIGH (ref 70–99)
GLUCOSE BLDC GLUCOMTR-MCNC: 154 MG/DL — HIGH (ref 70–99)
GLUCOSE BLDC GLUCOMTR-MCNC: 164 MG/DL — HIGH (ref 70–99)
GLUCOSE BLDC GLUCOMTR-MCNC: 169 MG/DL — HIGH (ref 70–99)
GLUCOSE BLDC GLUCOMTR-MCNC: 170 MG/DL — HIGH (ref 70–99)
GLUCOSE BLDC GLUCOMTR-MCNC: 92 MG/DL — SIGNIFICANT CHANGE UP (ref 70–99)
GLUCOSE SERPL-MCNC: 147 MG/DL — HIGH (ref 70–99)
GLUCOSE SERPL-MCNC: 148 MG/DL — HIGH (ref 70–99)
GLUCOSE SERPL-MCNC: 193 MG/DL — HIGH (ref 70–99)
GLUCOSE SERPL-MCNC: 91 MG/DL — SIGNIFICANT CHANGE UP (ref 70–99)
HCO3 BLDA-SCNC: 24 MMOL/L — SIGNIFICANT CHANGE UP (ref 21–28)
HCT VFR BLD CALC: 27 % — LOW (ref 34.5–45)
HCT VFR BLD CALC: 34.9 % — SIGNIFICANT CHANGE UP (ref 34.5–45)
HCT VFR BLD CALC: 35.3 % — SIGNIFICANT CHANGE UP (ref 34.5–45)
HCT VFR BLD CALC: 36.6 % — SIGNIFICANT CHANGE UP (ref 34.5–45)
HGB BLD-MCNC: 12 G/DL — SIGNIFICANT CHANGE UP (ref 11.5–15.5)
HGB BLD-MCNC: 12.1 G/DL — SIGNIFICANT CHANGE UP (ref 11.5–15.5)
HGB BLD-MCNC: 12.4 G/DL — SIGNIFICANT CHANGE UP (ref 11.5–15.5)
HGB BLD-MCNC: 8.9 G/DL — LOW (ref 11.5–15.5)
INR BLD: 1.03 — SIGNIFICANT CHANGE UP (ref 0.88–1.16)
INR BLD: 1.06 — SIGNIFICANT CHANGE UP (ref 0.88–1.16)
INR BLD: 1.14 — SIGNIFICANT CHANGE UP (ref 0.88–1.16)
INR BLD: 1.16 — SIGNIFICANT CHANGE UP (ref 0.88–1.16)
LACTATE SERPL-SCNC: 1.1 MMOL/L — SIGNIFICANT CHANGE UP (ref 0.5–2)
LACTATE SERPL-SCNC: 1.2 MMOL/L — SIGNIFICANT CHANGE UP (ref 0.5–2)
LACTATE SERPL-SCNC: 2.3 MMOL/L — HIGH (ref 0.5–2)
LYMPHOCYTES # BLD AUTO: 30.4 % — SIGNIFICANT CHANGE UP (ref 13–44)
MAGNESIUM SERPL-MCNC: 1.6 MG/DL — SIGNIFICANT CHANGE UP (ref 1.6–2.6)
MAGNESIUM SERPL-MCNC: 2.2 MG/DL — SIGNIFICANT CHANGE UP (ref 1.6–2.6)
MAGNESIUM SERPL-MCNC: 2.3 MG/DL — SIGNIFICANT CHANGE UP (ref 1.6–2.6)
MCHC RBC-ENTMCNC: 27 PG — SIGNIFICANT CHANGE UP (ref 27–34)
MCHC RBC-ENTMCNC: 27.3 PG — SIGNIFICANT CHANGE UP (ref 27–34)
MCHC RBC-ENTMCNC: 27.4 PG — SIGNIFICANT CHANGE UP (ref 27–34)
MCHC RBC-ENTMCNC: 27.9 PG — SIGNIFICANT CHANGE UP (ref 27–34)
MCHC RBC-ENTMCNC: 33 G/DL — SIGNIFICANT CHANGE UP (ref 32–36)
MCHC RBC-ENTMCNC: 33.9 G/DL — SIGNIFICANT CHANGE UP (ref 32–36)
MCHC RBC-ENTMCNC: 34 G/DL — SIGNIFICANT CHANGE UP (ref 32–36)
MCHC RBC-ENTMCNC: 34.7 G/DL — SIGNIFICANT CHANGE UP (ref 32–36)
MCV RBC AUTO: 80.4 FL — SIGNIFICANT CHANGE UP (ref 80–100)
MCV RBC AUTO: 80.4 FL — SIGNIFICANT CHANGE UP (ref 80–100)
MCV RBC AUTO: 80.6 FL — SIGNIFICANT CHANGE UP (ref 80–100)
MCV RBC AUTO: 81.8 FL — SIGNIFICANT CHANGE UP (ref 80–100)
MONOCYTES NFR BLD AUTO: 5.6 % — SIGNIFICANT CHANGE UP (ref 2–14)
NEUTROPHILS NFR BLD AUTO: 62.2 % — SIGNIFICANT CHANGE UP (ref 43–77)
PCO2 BLDA: 38 MMHG — SIGNIFICANT CHANGE UP (ref 32–45)
PH BLDA: 7.42 — SIGNIFICANT CHANGE UP (ref 7.35–7.45)
PHOSPHATE SERPL-MCNC: 2.8 MG/DL — SIGNIFICANT CHANGE UP (ref 2.5–4.5)
PHOSPHATE SERPL-MCNC: 3.3 MG/DL — SIGNIFICANT CHANGE UP (ref 2.5–4.5)
PLATELET # BLD AUTO: 109 K/UL — LOW (ref 150–400)
PLATELET # BLD AUTO: 174 K/UL — SIGNIFICANT CHANGE UP (ref 150–400)
PLATELET # BLD AUTO: 90 K/UL — LOW (ref 150–400)
PLATELET # BLD AUTO: 92 K/UL — LOW (ref 150–400)
PO2 BLDA: 123 MMHG — HIGH (ref 83–108)
POTASSIUM SERPL-MCNC: 3.9 MMOL/L — SIGNIFICANT CHANGE UP (ref 3.5–5.3)
POTASSIUM SERPL-MCNC: 4.4 MMOL/L — SIGNIFICANT CHANGE UP (ref 3.5–5.3)
POTASSIUM SERPL-MCNC: 4.5 MMOL/L — SIGNIFICANT CHANGE UP (ref 3.5–5.3)
POTASSIUM SERPL-MCNC: 4.7 MMOL/L — SIGNIFICANT CHANGE UP (ref 3.5–5.3)
POTASSIUM SERPL-SCNC: 3.9 MMOL/L — SIGNIFICANT CHANGE UP (ref 3.5–5.3)
POTASSIUM SERPL-SCNC: 4.4 MMOL/L — SIGNIFICANT CHANGE UP (ref 3.5–5.3)
POTASSIUM SERPL-SCNC: 4.5 MMOL/L — SIGNIFICANT CHANGE UP (ref 3.5–5.3)
POTASSIUM SERPL-SCNC: 4.7 MMOL/L — SIGNIFICANT CHANGE UP (ref 3.5–5.3)
PROT SERPL-MCNC: 5 G/DL — LOW (ref 6–8.3)
PROT SERPL-MCNC: 5.7 G/DL — LOW (ref 6–8.3)
PROT SERPL-MCNC: 6.2 G/DL — SIGNIFICANT CHANGE UP (ref 6–8.3)
PROT SERPL-MCNC: 6.5 G/DL — SIGNIFICANT CHANGE UP (ref 6–8.3)
PROTHROM AB SERPL-ACNC: 11.7 SEC — SIGNIFICANT CHANGE UP (ref 10–12.9)
PROTHROM AB SERPL-ACNC: 12 SEC — SIGNIFICANT CHANGE UP (ref 10–12.9)
PROTHROM AB SERPL-ACNC: 12.9 SEC — SIGNIFICANT CHANGE UP (ref 10–12.9)
PROTHROM AB SERPL-ACNC: 13.2 SEC — HIGH (ref 10–12.9)
RBC # BLD: 3.3 M/UL — LOW (ref 3.8–5.2)
RBC # BLD: 4.34 M/UL — SIGNIFICANT CHANGE UP (ref 3.8–5.2)
RBC # BLD: 4.38 M/UL — SIGNIFICANT CHANGE UP (ref 3.8–5.2)
RBC # BLD: 4.55 M/UL — SIGNIFICANT CHANGE UP (ref 3.8–5.2)
RBC # FLD: 12.3 % — SIGNIFICANT CHANGE UP (ref 10.3–16.9)
RBC # FLD: 13.3 % — SIGNIFICANT CHANGE UP (ref 10.3–16.9)
RBC # FLD: 13.4 % — SIGNIFICANT CHANGE UP (ref 10.3–16.9)
RBC # FLD: 13.7 % — SIGNIFICANT CHANGE UP (ref 10.3–16.9)
SAO2 % BLDA: 98 % — SIGNIFICANT CHANGE UP (ref 95–100)
SODIUM SERPL-SCNC: 137 MMOL/L — SIGNIFICANT CHANGE UP (ref 135–145)
SODIUM SERPL-SCNC: 139 MMOL/L — SIGNIFICANT CHANGE UP (ref 135–145)
SODIUM SERPL-SCNC: 139 MMOL/L — SIGNIFICANT CHANGE UP (ref 135–145)
SODIUM SERPL-SCNC: 140 MMOL/L — SIGNIFICANT CHANGE UP (ref 135–145)
WBC # BLD: 6.5 K/UL — SIGNIFICANT CHANGE UP (ref 3.8–10.5)
WBC # BLD: 8.3 K/UL — SIGNIFICANT CHANGE UP (ref 3.8–10.5)
WBC # BLD: 8.9 K/UL — SIGNIFICANT CHANGE UP (ref 3.8–10.5)
WBC # BLD: 9 K/UL — SIGNIFICANT CHANGE UP (ref 3.8–10.5)
WBC # FLD AUTO: 6.5 K/UL — SIGNIFICANT CHANGE UP (ref 3.8–10.5)
WBC # FLD AUTO: 8.3 K/UL — SIGNIFICANT CHANGE UP (ref 3.8–10.5)
WBC # FLD AUTO: 8.9 K/UL — SIGNIFICANT CHANGE UP (ref 3.8–10.5)
WBC # FLD AUTO: 9 K/UL — SIGNIFICANT CHANGE UP (ref 3.8–10.5)

## 2018-11-01 PROCEDURE — 99292 CRITICAL CARE ADDL 30 MIN: CPT

## 2018-11-01 PROCEDURE — 33533 CABG ARTERIAL SINGLE: CPT

## 2018-11-01 PROCEDURE — 93010 ELECTROCARDIOGRAM REPORT: CPT

## 2018-11-01 PROCEDURE — 33518 CABG ARTERY-VEIN TWO: CPT

## 2018-11-01 PROCEDURE — 33533 CABG ARTERIAL SINGLE: CPT | Mod: AS

## 2018-11-01 PROCEDURE — 71045 X-RAY EXAM CHEST 1 VIEW: CPT | Mod: 26

## 2018-11-01 PROCEDURE — 33518 CABG ARTERY-VEIN TWO: CPT | Mod: AS

## 2018-11-01 PROCEDURE — 99291 CRITICAL CARE FIRST HOUR: CPT

## 2018-11-01 RX ORDER — ALBUMIN HUMAN 25 %
250 VIAL (ML) INTRAVENOUS ONCE
Qty: 0 | Refills: 0 | Status: COMPLETED | OUTPATIENT
Start: 2018-11-01 | End: 2018-11-01

## 2018-11-01 RX ORDER — PHENYLEPHRINE HYDROCHLORIDE 10 MG/ML
0.2 INJECTION INTRAVENOUS
Qty: 40 | Refills: 0 | Status: DISCONTINUED | OUTPATIENT
Start: 2018-11-01 | End: 2018-11-02

## 2018-11-01 RX ORDER — PANTOPRAZOLE SODIUM 20 MG/1
40 TABLET, DELAYED RELEASE ORAL DAILY
Qty: 0 | Refills: 0 | Status: DISCONTINUED | OUTPATIENT
Start: 2018-11-01 | End: 2018-11-02

## 2018-11-01 RX ORDER — INSULIN HUMAN 100 [IU]/ML
1 INJECTION, SOLUTION SUBCUTANEOUS
Qty: 100 | Refills: 0 | Status: DISCONTINUED | OUTPATIENT
Start: 2018-11-01 | End: 2018-11-03

## 2018-11-01 RX ORDER — SODIUM CHLORIDE 9 MG/ML
1000 INJECTION INTRAMUSCULAR; INTRAVENOUS; SUBCUTANEOUS
Qty: 0 | Refills: 0 | Status: DISCONTINUED | OUTPATIENT
Start: 2018-11-01 | End: 2018-11-05

## 2018-11-01 RX ORDER — PROPOFOL 10 MG/ML
10 INJECTION, EMULSION INTRAVENOUS
Qty: 1000 | Refills: 0 | Status: DISCONTINUED | OUTPATIENT
Start: 2018-11-01 | End: 2018-11-01

## 2018-11-01 RX ORDER — ASPIRIN/CALCIUM CARB/MAGNESIUM 324 MG
81 TABLET ORAL DAILY
Qty: 0 | Refills: 0 | Status: DISCONTINUED | OUTPATIENT
Start: 2018-11-01 | End: 2018-11-05

## 2018-11-01 RX ORDER — FUROSEMIDE 40 MG
10 TABLET ORAL ONCE
Qty: 0 | Refills: 0 | Status: COMPLETED | OUTPATIENT
Start: 2018-11-01 | End: 2018-11-01

## 2018-11-01 RX ORDER — CEFAZOLIN SODIUM 1 G
2000 VIAL (EA) INJECTION EVERY 8 HOURS
Qty: 0 | Refills: 0 | Status: COMPLETED | OUTPATIENT
Start: 2018-11-01 | End: 2018-11-02

## 2018-11-01 RX ORDER — DEXTROSE 50 % IN WATER 50 %
50 SYRINGE (ML) INTRAVENOUS
Qty: 0 | Refills: 0 | Status: DISCONTINUED | OUTPATIENT
Start: 2018-11-01 | End: 2018-11-03

## 2018-11-01 RX ORDER — FENTANYL CITRATE 50 UG/ML
25 INJECTION INTRAVENOUS ONCE
Qty: 0 | Refills: 0 | Status: DISCONTINUED | OUTPATIENT
Start: 2018-11-01 | End: 2018-11-01

## 2018-11-01 RX ORDER — CHLORHEXIDINE GLUCONATE 213 G/1000ML
5 SOLUTION TOPICAL EVERY 4 HOURS
Qty: 0 | Refills: 0 | Status: DISCONTINUED | OUTPATIENT
Start: 2018-11-01 | End: 2018-11-05

## 2018-11-01 RX ORDER — DEXTROSE 50 % IN WATER 50 %
25 SYRINGE (ML) INTRAVENOUS
Qty: 0 | Refills: 0 | Status: DISCONTINUED | OUTPATIENT
Start: 2018-11-01 | End: 2018-11-03

## 2018-11-01 RX ORDER — CLEVIDIPINE BUTYRATE 50MG/100ML
2.5 VIAL (ML) INTRAVENOUS
Qty: 25 | Refills: 0 | Status: DISCONTINUED | OUTPATIENT
Start: 2018-11-01 | End: 2018-11-01

## 2018-11-01 RX ORDER — ACETAMINOPHEN 500 MG
1000 TABLET ORAL ONCE
Qty: 0 | Refills: 0 | Status: COMPLETED | OUTPATIENT
Start: 2018-11-01 | End: 2018-11-01

## 2018-11-01 RX ORDER — ATORVASTATIN CALCIUM 80 MG/1
20 TABLET, FILM COATED ORAL AT BEDTIME
Qty: 0 | Refills: 0 | Status: DISCONTINUED | OUTPATIENT
Start: 2018-11-01 | End: 2018-11-02

## 2018-11-01 RX ORDER — HEPARIN SODIUM 5000 [USP'U]/ML
5000 INJECTION INTRAVENOUS; SUBCUTANEOUS EVERY 8 HOURS
Qty: 0 | Refills: 0 | Status: DISCONTINUED | OUTPATIENT
Start: 2018-11-01 | End: 2018-11-05

## 2018-11-01 RX ORDER — CHLORHEXIDINE GLUCONATE 213 G/1000ML
1 SOLUTION TOPICAL DAILY
Qty: 0 | Refills: 0 | Status: DISCONTINUED | OUTPATIENT
Start: 2018-11-01 | End: 2018-11-05

## 2018-11-01 RX ORDER — ALBUMIN HUMAN 25 %
250 VIAL (ML) INTRAVENOUS
Qty: 0 | Refills: 0 | Status: COMPLETED | OUTPATIENT
Start: 2018-11-01 | End: 2018-11-01

## 2018-11-01 RX ORDER — POTASSIUM CHLORIDE 20 MEQ
20 PACKET (EA) ORAL ONCE
Qty: 0 | Refills: 0 | Status: COMPLETED | OUTPATIENT
Start: 2018-11-01 | End: 2018-11-01

## 2018-11-01 RX ORDER — MEPERIDINE HYDROCHLORIDE 50 MG/ML
25 INJECTION INTRAMUSCULAR; INTRAVENOUS; SUBCUTANEOUS ONCE
Qty: 0 | Refills: 0 | Status: DISCONTINUED | OUTPATIENT
Start: 2018-11-01 | End: 2018-11-01

## 2018-11-01 RX ADMIN — Medication 10 MILLIGRAM(S): at 15:30

## 2018-11-01 RX ADMIN — Medication 125 MILLILITER(S): at 23:51

## 2018-11-01 RX ADMIN — Medication 400 MILLIGRAM(S): at 13:58

## 2018-11-01 RX ADMIN — SODIUM CHLORIDE 10 MILLILITER(S): 9 INJECTION INTRAMUSCULAR; INTRAVENOUS; SUBCUTANEOUS at 13:05

## 2018-11-01 RX ADMIN — CHLORHEXIDINE GLUCONATE 10 MILLILITER(S): 213 SOLUTION TOPICAL at 05:53

## 2018-11-01 RX ADMIN — FENTANYL CITRATE 25 MICROGRAM(S): 50 INJECTION INTRAVENOUS at 13:45

## 2018-11-01 RX ADMIN — Medication 125 MILLILITER(S): at 14:02

## 2018-11-01 RX ADMIN — Medication 1: at 05:49

## 2018-11-01 RX ADMIN — Medication 125 MILLILITER(S): at 13:32

## 2018-11-01 RX ADMIN — Medication 100 MILLIGRAM(S): at 15:44

## 2018-11-01 RX ADMIN — INSULIN HUMAN 1 UNIT(S)/HR: 100 INJECTION, SOLUTION SUBCUTANEOUS at 14:30

## 2018-11-01 RX ADMIN — Medication 100 MILLIGRAM(S): at 23:35

## 2018-11-01 RX ADMIN — Medication 100 MILLIEQUIVALENT(S): at 21:53

## 2018-11-01 RX ADMIN — FENTANYL CITRATE 25 MICROGRAM(S): 50 INJECTION INTRAVENOUS at 15:00

## 2018-11-01 RX ADMIN — PANTOPRAZOLE SODIUM 40 MILLIGRAM(S): 20 TABLET, DELAYED RELEASE ORAL at 23:33

## 2018-11-01 RX ADMIN — Medication 81 MILLIGRAM(S): at 20:53

## 2018-11-01 RX ADMIN — CHLORHEXIDINE GLUCONATE 1 APPLICATION(S): 213 SOLUTION TOPICAL at 05:50

## 2018-11-01 RX ADMIN — ATORVASTATIN CALCIUM 20 MILLIGRAM(S): 80 TABLET, FILM COATED ORAL at 22:59

## 2018-11-01 RX ADMIN — Medication 1000 MILLIGRAM(S): at 15:35

## 2018-11-01 RX ADMIN — Medication 50 MILLIGRAM(S): at 05:49

## 2018-11-01 RX ADMIN — CHLORHEXIDINE GLUCONATE 5 MILLILITER(S): 213 SOLUTION TOPICAL at 22:59

## 2018-11-01 NOTE — PROGRESS NOTE ADULT - SUBJECTIVE AND OBJECTIVE BOX
CTICU  CRITICAL  CARE  attending     Hand off received 					   Pertinent clinical, laboratory, radiographic, hemodynamic, echocardiographic, respiratory data, microbiologic data and chart were reviewed and analyzed frequently throughout the course of the day and night  Patient seen and examined with CTS/ SH attending at bedside  Pt is a 68y , Female, HEALTH ISSUES - PROBLEM Dx:  Transition of care performed with sharing of clinical summary: Transition of care performed with sharing of clinical summary  Nutrition, metabolism, and development symptoms: Nutrition, metabolism, and development symptoms  Carotid stenosis: Carotid stenosis  Triple vessel coronary artery disease: Triple vessel coronary artery disease  CKD (chronic kidney disease) stage 3, GFR 30-59 ml/min: CKD (chronic kidney disease) stage 3, GFR 30-59 ml/min  DM (diabetes mellitus): DM (diabetes mellitus)  HLD (hyperlipidemia): HLD (hyperlipidemia)  HTN (hypertension): HTN (hypertension)  Chest pain: Chest pain      , FAMILY HISTORY:  Family history of coronary artery disease (Father)  PAST MEDICAL & SURGICAL HISTORY:  HLD (hyperlipidemia)  CKD (chronic kidney disease) stage 3, GFR 30-59 ml/min  HTN (hypertension)  DM (diabetes mellitus)  H/O eye surgery  H/O tubal ligation    Patient is a 68y old  Female who presents with a chief complaint of CABG/cardiovascular surgery (2018 18:01)      14 system review was unremarkable  acute changes include acute respiratory failure  Vital signs, hemodynamic and respiratory parameters were reviewed from the bedside nursing flowsheet.  ICU Vital Signs Last 24 Hrs  T(C): 37.5 (2018 20:00), Max: 37.5 (2018 20:00)  T(F): 99.5 (2018 20:00), Max: 99.5 (2018 20:00)  HR: 64 (2018 20:00) (50 - 82)  BP: 137/64 (2018 20:00) (124/61 - 140/65)  BP(mean): 96 (2018 20:00) (82 - 113)  ABP: 154/64 (2018 20:00) (122/46 - 168/55)  ABP(mean): 96 (2018 20:00) (66 - 100)  RR: 9 (2018 20:00) (7 - 90)  SpO2: 100% (2018 20:00) (98% - 100%)    Adult Advanced Hemodynamics Last 24 Hrs  CVP(mm Hg): 6 (2018 20:00) (6 - 23)  CVP(cm H2O): --  CO: --  CI: --  PA: --  PA(mean): --  PCWP: --  SVR: --  SVRI: --  PVR: --  PVRI: --, ABG - ( 2018 17:30 )  pH, Arterial: 7.38  pH, Blood: x     /  pCO2: 38    /  pO2: 126   / HCO3: 22    / Base Excess: -2.6  /  SaO2: 98                Mode: AC/ CMV (Assist Control/ Continuous Mandatory Ventilation)  RR (machine): 10  TV (machine): 500  FiO2: 50  PEEP: 5  ITime: 1  MAP: 9  PIP: 18    Intake and output was reviewed and the fluid balance was calculated  Daily Height in cm: 154.94 (2018 01:50)    Daily Weight in k.5 (2018 06:32)  I&O's Summary    31 Oct 2018 07:  -  2018 07:00  --------------------------------------------------------  IN: 1418 mL / OUT: 750 mL / NET: 668 mL    2018 07:01  -  2018 20:42  --------------------------------------------------------  IN: 817.9 mL / OUT: 1622 mL / NET: -804.1 mL        All lines and drain sites were assessed  Glycemic trend was reviewedCAPILLARY BLOOD GLUCOSE      POCT Blood Glucose.: 121 mg/dL (2018 19:32)    No acute change in mental status  Auscultation of the chest reveals equal bs  Abdomen is soft  Extremities are warm and well perfused  Wounds appear clean and unremarkable  Antibiotics are periop    labs  CBC Full  -  ( 2018 14:58 )  WBC Count : 8.3 K/uL  Hemoglobin : 12.1 g/dL  Hematocrit : 34.9 %  Platelet Count - Automated : 90 K/uL  Mean Cell Volume : 80.4 fL  Mean Cell Hemoglobin : 27.9 pg  Mean Cell Hemoglobin Concentration : 34.7 g/dL  Auto Neutrophil # : x  Auto Lymphocyte # : x  Auto Monocyte # : x  Auto Eosinophil # : x  Auto Basophil # : x  Auto Neutrophil % : x  Auto Lymphocyte % : x  Auto Monocyte % : x  Auto Eosinophil % : x  Auto Basophil % : x        139  |  101  |  21  ----------------------------<  193<H>  4.7   |  21<L>  |  1.25    Ca    9.1      2018 14:58  Phos  3.3       Mg     2.3         TPro  5.7<L>  /  Alb  3.5  /  TBili  0.7  /  DBili  x   /  AST  42<H>  /  ALT  11  /  AlkPhos  40      PT/INR - ( 2018 14:58 )   PT: 12.9 sec;   INR: 1.14          PTT - ( 2018 14:58 )  PTT:25.6 sec  The current medications were reviewed   MEDICATIONS  (STANDING):  aspirin enteric coated 81 milliGRAM(s) Oral daily  atorvastatin 20 milliGRAM(s) Oral at bedtime  ceFAZolin   IVPB 2000 milliGRAM(s) IV Intermittent every 8 hours  chlorhexidine 0.12% Liquid 5 milliLiter(s) Oral Mucosa every 4 hours  chlorhexidine 2% Cloths 1 Application(s) Topical daily  dextrose 50% Injectable 50 milliLiter(s) IV Push every 15 minutes  dextrose 50% Injectable 25 milliLiter(s) IV Push every 15 minutes  heparin  Injectable 5000 Unit(s) SubCutaneous every 8 hours  insulin Infusion 1 Unit(s)/Hr (1 mL/Hr) IV Continuous <Continuous>  pantoprazole  Injectable 40 milliGRAM(s) IV Push daily  phenylephrine    Infusion 0.2 MICROgram(s)/kG/Min (4.515 mL/Hr) IV Continuous <Continuous>  sodium chloride 0.9%. 1000 milliLiter(s) (10 mL/Hr) IV Continuous <Continuous>    MEDICATIONS  (PRN):       PROBLEM LIST/ ASSESSMENT:  HEALTH ISSUES - PROBLEM Dx:  Transition of care performed with sharing of clinical summary: Transition of care performed with sharing of clinical summary  Nutrition, metabolism, and development symptoms: Nutrition, metabolism, and development symptoms  Carotid stenosis: Carotid stenosis  Triple vessel coronary artery disease: Triple vessel coronary artery disease  CKD (chronic kidney disease) stage 3, GFR 30-59 ml/min: CKD (chronic kidney disease) stage 3, GFR 30-59 ml/min  DM (diabetes mellitus): DM (diabetes mellitus)  HLD (hyperlipidemia): HLD (hyperlipidemia)  HTN (hypertension): HTN (hypertension)  Chest pain: Chest pain      ,   Patient is a 68y old  Female who presents with a chief complaint of CABG/cardiovascular surgery (2018 18:01)     s/p cardiac surgery      My plan includes :  close hemodynamic, ventilatory and drain monitoring and management per post op routine    Monitor for arrhythmias and monitor parameters for organ perfusion  monitor neurologic status  Head of the bed should remain elevated to 45 deg .   chest PT and IS will be encouraged  monitor adequacy of oxygenation and ventilation and attempt to wean oxygen  Nutritional goals will be met using po eventually , ensure adequate caloric intake and montior the same  Stress ulcer and VTE prophylaxis will be achieved    Glycemic control is satisfactory  Electrolytes have been repleted as necessary and wound care has been carried out. Pain control has been achieved.   agressive physical therapy and early mobility and ambulation goals will be met   The family was updated about the course and plan  CRITICAL CARE TIME SPENT in evaluation and management, reassessments, review and interpretation of labs and x-rays, ventilator and hemodynamic management, formulating a plan and coordinating care: ___90____ MIN.  Time does not include procedural time.  CTICU ATTENDING     					    Jose M Hogan MD

## 2018-11-01 NOTE — PROGRESS NOTE ADULT - SUBJECTIVE AND OBJECTIVE BOX
RANJEET ZAPATA   MRN#: [id number]     The patient is a 68y Female who was seen, evaluated, & examined with the CTICU staff postoperatively with a multidisciplinary care plan formulated & implemented.  All available clinical, laboratory, radiographic, pharmacologic, and electrocardiographic data were reviewed & analyzed.      The patient was in the CTICU in critical condition secondary to acute hypoxic respiratory failure-persistent cardiopulmonary dysfunction, cardiogenic shock-cardiovascular dysfunction, hemodynamically significant anemia/hypovolemia-shock, & uncontrolled Type II Diabetes melitus-stress hyperglycemia.      Respiratory status-failure required supplemental oxygen-full ventilatory support, the following of ABG’s with A-line monitoring, continuous pulse oximetry monitoring, and IV Propofol for support & to evaluate for & prevent further decompensation secondary to persistent cardiopulmonary dysfunction and cardiogenic shock-cardiovascular dysfunction.     Invasive hemodynamic monitoring with a PA catheter & an A-line were required for the following of serial CI’s/MVO2’s & continuous MAP/BP monitoring to ensure adequate cardiovascular support and to evaluate for & help prevent decompensation while receiving intermittent volume expansion, blood transfusions, blood product transfusions, & an IV Phenylephrine drip secondary to cardiogenic shock-cardiovascular dysfunction,  hemodynamically significant anemia/hypovolemia-shock, hyperlactatemia-acidosis, & acute on chronic postoperative kidney injury-failure.       Metabolic stability, uncontrolled type II Diabetes mellitus-stress hyperglycemia, & infection prophylaxis required an IV regular Insulin drip & the following of serial glucose levels to help achieve & maintain euglycemia.      In addition, the patient is overall doing well. She is slow to arouse but follows commands and is neurologically intact. She is off pressors and is maintaining a target MAP of 90 (higher due to her existing carotid disease) off of pressors after volume boluses. As she becomes more awake we will work to trial her on CPAP and extubate.     Patient required critical care management and I provided 70 minutes of non-continuous care to the patient.  Discussed at length with the CTICU staff and helped coordinate care.

## 2018-11-01 NOTE — BRIEF OPERATIVE NOTE - PROCEDURE
<<-----Click on this checkbox to enter Procedure CABG  11/01/2018  LIMA-LAD, SVG-PDA, OM  Active  MODONOGUE

## 2018-11-02 LAB
ALBUMIN SERPL ELPH-MCNC: 3.6 G/DL — SIGNIFICANT CHANGE UP (ref 3.3–5)
ALBUMIN SERPL ELPH-MCNC: 4.1 G/DL — SIGNIFICANT CHANGE UP (ref 3.3–5)
ALP SERPL-CCNC: 36 U/L — LOW (ref 40–120)
ALP SERPL-CCNC: 38 U/L — LOW (ref 40–120)
ALT FLD-CCNC: 10 U/L — SIGNIFICANT CHANGE UP (ref 10–45)
ALT FLD-CCNC: 8 U/L — LOW (ref 10–45)
ANION GAP SERPL CALC-SCNC: 13 MMOL/L — SIGNIFICANT CHANGE UP (ref 5–17)
ANION GAP SERPL CALC-SCNC: 16 MMOL/L — SIGNIFICANT CHANGE UP (ref 5–17)
APTT BLD: 26.5 SEC — LOW (ref 27.5–36.3)
APTT BLD: 27.9 SEC — SIGNIFICANT CHANGE UP (ref 27.5–36.3)
AST SERPL-CCNC: 43 U/L — HIGH (ref 10–40)
AST SERPL-CCNC: 47 U/L — HIGH (ref 10–40)
BASE EXCESS BLDA CALC-SCNC: -0.3 MMOL/L — SIGNIFICANT CHANGE UP (ref -2–3)
BASE EXCESS BLDA CALC-SCNC: -1.7 MMOL/L — SIGNIFICANT CHANGE UP (ref -2–3)
BILIRUB DIRECT SERPL-MCNC: <0.2 MG/DL — SIGNIFICANT CHANGE UP (ref 0–0.2)
BILIRUB INDIRECT FLD-MCNC: >0.2 MG/DL — SIGNIFICANT CHANGE UP (ref 0.2–1)
BILIRUB SERPL-MCNC: 0.3 MG/DL — SIGNIFICANT CHANGE UP (ref 0.2–1.2)
BILIRUB SERPL-MCNC: 0.4 MG/DL — SIGNIFICANT CHANGE UP (ref 0.2–1.2)
BUN SERPL-MCNC: 19 MG/DL — SIGNIFICANT CHANGE UP (ref 7–23)
BUN SERPL-MCNC: 20 MG/DL — SIGNIFICANT CHANGE UP (ref 7–23)
CALCIUM SERPL-MCNC: 8.9 MG/DL — SIGNIFICANT CHANGE UP (ref 8.4–10.5)
CALCIUM SERPL-MCNC: 9.4 MG/DL — SIGNIFICANT CHANGE UP (ref 8.4–10.5)
CHLORIDE SERPL-SCNC: 100 MMOL/L — SIGNIFICANT CHANGE UP (ref 96–108)
CHLORIDE SERPL-SCNC: 100 MMOL/L — SIGNIFICANT CHANGE UP (ref 96–108)
CO2 SERPL-SCNC: 22 MMOL/L — SIGNIFICANT CHANGE UP (ref 22–31)
CO2 SERPL-SCNC: 24 MMOL/L — SIGNIFICANT CHANGE UP (ref 22–31)
CREAT SERPL-MCNC: 1.19 MG/DL — SIGNIFICANT CHANGE UP (ref 0.5–1.3)
CREAT SERPL-MCNC: 1.43 MG/DL — HIGH (ref 0.5–1.3)
GAS PNL BLDA: SIGNIFICANT CHANGE UP
GLUCOSE BLDC GLUCOMTR-MCNC: 109 MG/DL — HIGH (ref 70–99)
GLUCOSE BLDC GLUCOMTR-MCNC: 111 MG/DL — HIGH (ref 70–99)
GLUCOSE BLDC GLUCOMTR-MCNC: 116 MG/DL — HIGH (ref 70–99)
GLUCOSE BLDC GLUCOMTR-MCNC: 119 MG/DL — HIGH (ref 70–99)
GLUCOSE BLDC GLUCOMTR-MCNC: 121 MG/DL — HIGH (ref 70–99)
GLUCOSE BLDC GLUCOMTR-MCNC: 122 MG/DL — HIGH (ref 70–99)
GLUCOSE BLDC GLUCOMTR-MCNC: 131 MG/DL — HIGH (ref 70–99)
GLUCOSE BLDC GLUCOMTR-MCNC: 135 MG/DL — HIGH (ref 70–99)
GLUCOSE BLDC GLUCOMTR-MCNC: 142 MG/DL — HIGH (ref 70–99)
GLUCOSE BLDC GLUCOMTR-MCNC: 147 MG/DL — HIGH (ref 70–99)
GLUCOSE BLDC GLUCOMTR-MCNC: 198 MG/DL — HIGH (ref 70–99)
GLUCOSE SERPL-MCNC: 139 MG/DL — HIGH (ref 70–99)
GLUCOSE SERPL-MCNC: 156 MG/DL — HIGH (ref 70–99)
HCO3 BLDA-SCNC: 23 MMOL/L — SIGNIFICANT CHANGE UP (ref 21–28)
HCO3 BLDA-SCNC: 24 MMOL/L — SIGNIFICANT CHANGE UP (ref 21–28)
HCT VFR BLD CALC: 31.4 % — LOW (ref 34.5–45)
HCT VFR BLD CALC: 32.6 % — LOW (ref 34.5–45)
HGB BLD-MCNC: 10.6 G/DL — LOW (ref 11.5–15.5)
HGB BLD-MCNC: 11.1 G/DL — LOW (ref 11.5–15.5)
INR BLD: 1.14 — SIGNIFICANT CHANGE UP (ref 0.88–1.16)
INR BLD: 1.2 — HIGH (ref 0.88–1.16)
MAGNESIUM SERPL-MCNC: 1.8 MG/DL — SIGNIFICANT CHANGE UP (ref 1.6–2.6)
MAGNESIUM SERPL-MCNC: 2.7 MG/DL — HIGH (ref 1.6–2.6)
MCHC RBC-ENTMCNC: 27.6 PG — SIGNIFICANT CHANGE UP (ref 27–34)
MCHC RBC-ENTMCNC: 27.6 PG — SIGNIFICANT CHANGE UP (ref 27–34)
MCHC RBC-ENTMCNC: 33.8 G/DL — SIGNIFICANT CHANGE UP (ref 32–36)
MCHC RBC-ENTMCNC: 34 G/DL — SIGNIFICANT CHANGE UP (ref 32–36)
MCV RBC AUTO: 81.1 FL — SIGNIFICANT CHANGE UP (ref 80–100)
MCV RBC AUTO: 81.8 FL — SIGNIFICANT CHANGE UP (ref 80–100)
PCO2 BLDA: 37 MMHG — SIGNIFICANT CHANGE UP (ref 32–45)
PCO2 BLDA: 37 MMHG — SIGNIFICANT CHANGE UP (ref 32–45)
PH BLDA: 7.4 — SIGNIFICANT CHANGE UP (ref 7.35–7.45)
PH BLDA: 7.42 — SIGNIFICANT CHANGE UP (ref 7.35–7.45)
PHOSPHATE SERPL-MCNC: 3.1 MG/DL — SIGNIFICANT CHANGE UP (ref 2.5–4.5)
PLATELET # BLD AUTO: 103 K/UL — LOW (ref 150–400)
PLATELET # BLD AUTO: 112 K/UL — LOW (ref 150–400)
PO2 BLDA: 122 MMHG — HIGH (ref 83–108)
PO2 BLDA: 91 MMHG — SIGNIFICANT CHANGE UP (ref 83–108)
POTASSIUM SERPL-MCNC: 4.1 MMOL/L — SIGNIFICANT CHANGE UP (ref 3.5–5.3)
POTASSIUM SERPL-MCNC: 4.8 MMOL/L — SIGNIFICANT CHANGE UP (ref 3.5–5.3)
POTASSIUM SERPL-SCNC: 4.1 MMOL/L — SIGNIFICANT CHANGE UP (ref 3.5–5.3)
POTASSIUM SERPL-SCNC: 4.8 MMOL/L — SIGNIFICANT CHANGE UP (ref 3.5–5.3)
PROT SERPL-MCNC: 5.8 G/DL — LOW (ref 6–8.3)
PROT SERPL-MCNC: 6 G/DL — SIGNIFICANT CHANGE UP (ref 6–8.3)
PROTHROM AB SERPL-ACNC: 12.9 SEC — SIGNIFICANT CHANGE UP (ref 10–12.9)
PROTHROM AB SERPL-ACNC: 13.6 SEC — HIGH (ref 10–12.9)
RBC # BLD: 3.84 M/UL — SIGNIFICANT CHANGE UP (ref 3.8–5.2)
RBC # BLD: 4.02 M/UL — SIGNIFICANT CHANGE UP (ref 3.8–5.2)
RBC # FLD: 13.6 % — SIGNIFICANT CHANGE UP (ref 10.3–16.9)
RBC # FLD: 14.2 % — SIGNIFICANT CHANGE UP (ref 10.3–16.9)
SAO2 % BLDA: 97 % — SIGNIFICANT CHANGE UP (ref 95–100)
SAO2 % BLDA: 98 % — SIGNIFICANT CHANGE UP (ref 95–100)
SODIUM SERPL-SCNC: 137 MMOL/L — SIGNIFICANT CHANGE UP (ref 135–145)
SODIUM SERPL-SCNC: 138 MMOL/L — SIGNIFICANT CHANGE UP (ref 135–145)
WBC # BLD: 8.6 K/UL — SIGNIFICANT CHANGE UP (ref 3.8–10.5)
WBC # BLD: 8.9 K/UL — SIGNIFICANT CHANGE UP (ref 3.8–10.5)
WBC # FLD AUTO: 8.6 K/UL — SIGNIFICANT CHANGE UP (ref 3.8–10.5)
WBC # FLD AUTO: 8.9 K/UL — SIGNIFICANT CHANGE UP (ref 3.8–10.5)

## 2018-11-02 PROCEDURE — 71045 X-RAY EXAM CHEST 1 VIEW: CPT | Mod: 26

## 2018-11-02 PROCEDURE — 71045 X-RAY EXAM CHEST 1 VIEW: CPT | Mod: 26,77

## 2018-11-02 RX ORDER — ONDANSETRON 8 MG/1
4 TABLET, FILM COATED ORAL ONCE
Qty: 0 | Refills: 0 | Status: COMPLETED | OUTPATIENT
Start: 2018-11-02 | End: 2018-11-02

## 2018-11-02 RX ORDER — PHENYLEPHRINE HYDROCHLORIDE 10 MG/ML
0.2 INJECTION INTRAVENOUS
Qty: 40 | Refills: 0 | Status: DISCONTINUED | OUTPATIENT
Start: 2018-11-02 | End: 2018-11-03

## 2018-11-02 RX ORDER — OXYCODONE AND ACETAMINOPHEN 5; 325 MG/1; MG/1
2 TABLET ORAL EVERY 4 HOURS
Qty: 0 | Refills: 0 | Status: DISCONTINUED | OUTPATIENT
Start: 2018-11-02 | End: 2018-11-04

## 2018-11-02 RX ORDER — INSULIN GLARGINE 100 [IU]/ML
8 INJECTION, SOLUTION SUBCUTANEOUS AT BEDTIME
Qty: 0 | Refills: 0 | Status: DISCONTINUED | OUTPATIENT
Start: 2018-11-02 | End: 2018-11-03

## 2018-11-02 RX ORDER — CLOPIDOGREL BISULFATE 75 MG/1
75 TABLET, FILM COATED ORAL DAILY
Qty: 0 | Refills: 0 | Status: DISCONTINUED | OUTPATIENT
Start: 2018-11-02 | End: 2018-11-05

## 2018-11-02 RX ORDER — SODIUM CHLORIDE 9 MG/ML
1000 INJECTION, SOLUTION INTRAVENOUS
Qty: 0 | Refills: 0 | Status: DISCONTINUED | OUTPATIENT
Start: 2018-11-02 | End: 2018-11-05

## 2018-11-02 RX ORDER — PANTOPRAZOLE SODIUM 20 MG/1
40 TABLET, DELAYED RELEASE ORAL
Qty: 0 | Refills: 0 | Status: DISCONTINUED | OUTPATIENT
Start: 2018-11-02 | End: 2018-11-05

## 2018-11-02 RX ORDER — ACETAMINOPHEN 500 MG
1000 TABLET ORAL ONCE
Qty: 0 | Refills: 0 | Status: COMPLETED | OUTPATIENT
Start: 2018-11-02 | End: 2018-11-02

## 2018-11-02 RX ORDER — ACETAMINOPHEN 500 MG
650 TABLET ORAL EVERY 6 HOURS
Qty: 0 | Refills: 0 | Status: DISCONTINUED | OUTPATIENT
Start: 2018-11-02 | End: 2018-11-05

## 2018-11-02 RX ORDER — DEXTROSE 50 % IN WATER 50 %
25 SYRINGE (ML) INTRAVENOUS ONCE
Qty: 0 | Refills: 0 | Status: DISCONTINUED | OUTPATIENT
Start: 2018-11-02 | End: 2018-11-03

## 2018-11-02 RX ORDER — DEXTROSE 50 % IN WATER 50 %
12.5 SYRINGE (ML) INTRAVENOUS ONCE
Qty: 0 | Refills: 0 | Status: DISCONTINUED | OUTPATIENT
Start: 2018-11-02 | End: 2018-11-03

## 2018-11-02 RX ORDER — DEXTROSE 50 % IN WATER 50 %
15 SYRINGE (ML) INTRAVENOUS ONCE
Qty: 0 | Refills: 0 | Status: DISCONTINUED | OUTPATIENT
Start: 2018-11-02 | End: 2018-11-03

## 2018-11-02 RX ORDER — ALBUMIN HUMAN 25 %
250 VIAL (ML) INTRAVENOUS ONCE
Qty: 0 | Refills: 0 | Status: COMPLETED | OUTPATIENT
Start: 2018-11-02 | End: 2018-11-02

## 2018-11-02 RX ORDER — MAGNESIUM SULFATE 500 MG/ML
2 VIAL (ML) INJECTION ONCE
Qty: 0 | Refills: 0 | Status: COMPLETED | OUTPATIENT
Start: 2018-11-02 | End: 2018-11-02

## 2018-11-02 RX ORDER — GLUCAGON INJECTION, SOLUTION 0.5 MG/.1ML
1 INJECTION, SOLUTION SUBCUTANEOUS ONCE
Qty: 0 | Refills: 0 | Status: DISCONTINUED | OUTPATIENT
Start: 2018-11-02 | End: 2018-11-03

## 2018-11-02 RX ORDER — FUROSEMIDE 40 MG
10 TABLET ORAL ONCE
Qty: 0 | Refills: 0 | Status: COMPLETED | OUTPATIENT
Start: 2018-11-02 | End: 2018-11-02

## 2018-11-02 RX ORDER — OXYCODONE AND ACETAMINOPHEN 5; 325 MG/1; MG/1
1 TABLET ORAL EVERY 4 HOURS
Qty: 0 | Refills: 0 | Status: DISCONTINUED | OUTPATIENT
Start: 2018-11-02 | End: 2018-11-05

## 2018-11-02 RX ORDER — HUMAN INSULIN 100 [IU]/ML
10 INJECTION, SUSPENSION SUBCUTANEOUS ONCE
Qty: 0 | Refills: 0 | Status: COMPLETED | OUTPATIENT
Start: 2018-11-02 | End: 2018-11-02

## 2018-11-02 RX ORDER — ATORVASTATIN CALCIUM 80 MG/1
80 TABLET, FILM COATED ORAL AT BEDTIME
Qty: 0 | Refills: 0 | Status: DISCONTINUED | OUTPATIENT
Start: 2018-11-02 | End: 2018-11-05

## 2018-11-02 RX ORDER — METOPROLOL TARTRATE 50 MG
12.5 TABLET ORAL EVERY 6 HOURS
Qty: 0 | Refills: 0 | Status: DISCONTINUED | OUTPATIENT
Start: 2018-11-02 | End: 2018-11-02

## 2018-11-02 RX ORDER — INSULIN LISPRO 100/ML
VIAL (ML) SUBCUTANEOUS
Qty: 0 | Refills: 0 | Status: DISCONTINUED | OUTPATIENT
Start: 2018-11-02 | End: 2018-11-05

## 2018-11-02 RX ORDER — POTASSIUM CHLORIDE 20 MEQ
20 PACKET (EA) ORAL
Qty: 0 | Refills: 0 | Status: DISCONTINUED | OUTPATIENT
Start: 2018-11-02 | End: 2018-11-03

## 2018-11-02 RX ORDER — POTASSIUM CHLORIDE 20 MEQ
20 PACKET (EA) ORAL ONCE
Qty: 0 | Refills: 0 | Status: COMPLETED | OUTPATIENT
Start: 2018-11-02 | End: 2018-11-02

## 2018-11-02 RX ADMIN — Medication 400 MILLIGRAM(S): at 08:49

## 2018-11-02 RX ADMIN — Medication 10 MILLIGRAM(S): at 07:15

## 2018-11-02 RX ADMIN — HEPARIN SODIUM 5000 UNIT(S): 5000 INJECTION INTRAVENOUS; SUBCUTANEOUS at 05:12

## 2018-11-02 RX ADMIN — CHLORHEXIDINE GLUCONATE 5 MILLILITER(S): 213 SOLUTION TOPICAL at 01:17

## 2018-11-02 RX ADMIN — OXYCODONE AND ACETAMINOPHEN 1 TABLET(S): 5; 325 TABLET ORAL at 13:49

## 2018-11-02 RX ADMIN — Medication 2: at 16:45

## 2018-11-02 RX ADMIN — OXYCODONE AND ACETAMINOPHEN 1 TABLET(S): 5; 325 TABLET ORAL at 13:24

## 2018-11-02 RX ADMIN — CHLORHEXIDINE GLUCONATE 5 MILLILITER(S): 213 SOLUTION TOPICAL at 05:11

## 2018-11-02 RX ADMIN — Medication 1000 MILLIGRAM(S): at 05:10

## 2018-11-02 RX ADMIN — Medication 400 MILLIGRAM(S): at 02:07

## 2018-11-02 RX ADMIN — Medication 81 MILLIGRAM(S): at 12:09

## 2018-11-02 RX ADMIN — CLOPIDOGREL BISULFATE 75 MILLIGRAM(S): 75 TABLET, FILM COATED ORAL at 12:09

## 2018-11-02 RX ADMIN — Medication 100 MILLIGRAM(S): at 16:45

## 2018-11-02 RX ADMIN — Medication 50 GRAM(S): at 05:22

## 2018-11-02 RX ADMIN — Medication 650 MILLIGRAM(S): at 23:12

## 2018-11-02 RX ADMIN — PANTOPRAZOLE SODIUM 40 MILLIGRAM(S): 20 TABLET, DELAYED RELEASE ORAL at 10:35

## 2018-11-02 RX ADMIN — Medication 650 MILLIGRAM(S): at 19:54

## 2018-11-02 RX ADMIN — Medication 1000 MILLIGRAM(S): at 09:22

## 2018-11-02 RX ADMIN — Medication 2: at 23:37

## 2018-11-02 RX ADMIN — Medication 125 MILLILITER(S): at 06:29

## 2018-11-02 RX ADMIN — Medication 100 MILLIEQUIVALENT(S): at 06:31

## 2018-11-02 RX ADMIN — ONDANSETRON 4 MILLIGRAM(S): 8 TABLET, FILM COATED ORAL at 08:49

## 2018-11-02 RX ADMIN — Medication 100 MILLIGRAM(S): at 23:12

## 2018-11-02 RX ADMIN — Medication 100 MILLIGRAM(S): at 07:16

## 2018-11-02 RX ADMIN — HUMAN INSULIN 10 UNIT(S): 100 INJECTION, SUSPENSION SUBCUTANEOUS at 10:36

## 2018-11-02 RX ADMIN — CHLORHEXIDINE GLUCONATE 1 APPLICATION(S): 213 SOLUTION TOPICAL at 12:06

## 2018-11-02 RX ADMIN — HEPARIN SODIUM 5000 UNIT(S): 5000 INJECTION INTRAVENOUS; SUBCUTANEOUS at 23:12

## 2018-11-02 RX ADMIN — Medication 650 MILLIGRAM(S): at 17:45

## 2018-11-02 RX ADMIN — CHLORHEXIDINE GLUCONATE 5 MILLILITER(S): 213 SOLUTION TOPICAL at 19:31

## 2018-11-02 RX ADMIN — HEPARIN SODIUM 5000 UNIT(S): 5000 INJECTION INTRAVENOUS; SUBCUTANEOUS at 13:56

## 2018-11-02 RX ADMIN — ATORVASTATIN CALCIUM 80 MILLIGRAM(S): 80 TABLET, FILM COATED ORAL at 23:36

## 2018-11-02 RX ADMIN — INSULIN GLARGINE 8 UNIT(S): 100 INJECTION, SOLUTION SUBCUTANEOUS at 23:39

## 2018-11-02 RX ADMIN — CHLORHEXIDINE GLUCONATE 5 MILLILITER(S): 213 SOLUTION TOPICAL at 23:12

## 2018-11-02 NOTE — CONSULT NOTE ADULT - SUBJECTIVE AND OBJECTIVE BOX
Vascular Surgery Consult    HPI:  69yo patient of Dr. Dewey with known L carotid disease, with a PMH of CAD, HTN, HLD, DM, CKDIII who presented to [facility] with chest pain, and is now s/p CABG on 11/2.    PAST MEDICAL & SURGICAL HISTORY:  HLD (hyperlipidemia)  CKD (chronic kidney disease) stage 3, GFR 30-59 ml/min  HTN (hypertension)  DM (diabetes mellitus)  H/O eye surgery  H/O tubal ligation    MEDICATIONS  (STANDING):  aspirin enteric coated 81 milliGRAM(s) Oral daily  atorvastatin 80 milliGRAM(s) Oral at bedtime  ceFAZolin   IVPB 2000 milliGRAM(s) IV Intermittent every 8 hours  chlorhexidine 0.12% Liquid 5 milliLiter(s) Oral Mucosa every 4 hours  chlorhexidine 2% Cloths 1 Application(s) Topical daily  clopidogrel Tablet 75 milliGRAM(s) Oral daily  dextrose 5%. 1000 milliLiter(s) (50 mL/Hr) IV Continuous <Continuous>  dextrose 50% Injectable 12.5 Gram(s) IV Push once  dextrose 50% Injectable 25 Gram(s) IV Push once  dextrose 50% Injectable 25 Gram(s) IV Push once  dextrose 50% Injectable 50 milliLiter(s) IV Push every 15 minutes  dextrose 50% Injectable 25 milliLiter(s) IV Push every 15 minutes  heparin  Injectable 5000 Unit(s) SubCutaneous every 8 hours  insulin glargine Injectable (LANTUS) 8 Unit(s) SubCutaneous at bedtime  insulin Infusion 1 Unit(s)/Hr (1 mL/Hr) IV Continuous <Continuous>  insulin lispro (HumaLOG) corrective regimen sliding scale   SubCutaneous Before meals and at bedtime  pantoprazole    Tablet 40 milliGRAM(s) Oral before breakfast  phenylephrine    Infusion 0.2 MICROgram(s)/kG/Min (4.515 mL/Hr) IV Continuous <Continuous>  sodium chloride 0.9%. 1000 milliLiter(s) (10 mL/Hr) IV Continuous <Continuous>    MEDICATIONS  (PRN):  acetaminophen   Tablet .. 650 milliGRAM(s) Oral every 6 hours PRN Mild Pain (1 - 3)  dextrose 40% Gel 15 Gram(s) Oral once PRN Blood Glucose LESS THAN 70 milliGRAM(s)/deciliter  glucagon  Injectable 1 milliGRAM(s) IntraMuscular once PRN Glucose LESS THAN 70 milligrams/deciliter  oxyCODONE    5 mG/acetaminophen 325 mG 1 Tablet(s) Oral every 4 hours PRN Moderate Pain (4 - 6)  oxyCODONE    5 mG/acetaminophen 325 mG 2 Tablet(s) Oral every 4 hours PRN Severe Pain (7 - 10)  potassium chloride  20 mEq/100 mL IVPB 20 milliEquivalent(s) IV Intermittent every 1 hour PRN K<4.0    Allergies  No Known Allergies    FAMILY HISTORY:  Family history of coronary artery disease (Father)    Vital Signs Last 24 Hrs  T(C): 36.8 (02 Nov 2018 05:01), Max: 38.1 (02 Nov 2018 01:00)  T(F): 98.2 (02 Nov 2018 05:01), Max: 100.6 (02 Nov 2018 01:00)  HR: 74 (02 Nov 2018 14:00) (60 - 74)  BP: 138/80 (02 Nov 2018 14:00) (111/56 - 154/63)  BP(mean): 107 (02 Nov 2018 14:00) (75 - 109)  RR: 24 (02 Nov 2018 14:00) (7 - 24)  SpO2: 92% (02 Nov 2018 14:00) (92% - 100%)    I&O's Summary  01 Nov 2018 07:01  -  02 Nov 2018 07:00  --------------------------------------------------------  IN: 2163.2 mL / OUT: 2531 mL / NET: -367.8 mL    02 Nov 2018 07:01  -  02 Nov 2018 14:19  --------------------------------------------------------  IN: 244.6 mL / OUT: 405 mL / NET: -160.4 mL        Physical Exam:  General: appears weak, sitting in chair  HEENT: R Central line in place  Pulmonary: normal resp effort  Thorax: Midline thoracotomy incision dressing in place, c/d/i  Neuro: A/O x 3, CNs II-XII grossly intact, normal sensation; sensation grossly intact in upper and lower extremities; motor intact in bilateral lower extremities; minimal weakness in L hand    LABS:                    10.6   8.9   )-----------( 112      ( 02 Nov 2018 11:25 )             31.4     11-02    137  |  100  |  20  ----------------------------<  156<H>  4.8   |  24  |  1.43<H>    Ca    9.4      02 Nov 2018 11:25  Phos  3.1     11-02  Mg     2.7     11-02    TPro  6.0  /  Alb  4.1  /  TBili  0.4  /  DBili  <0.2  /  AST  43<H>  /  ALT  8<L>  /  AlkPhos  38<L>  11-02  PT/INR - ( 02 Nov 2018 11:25 )   PT: 13.6 sec;   INR: 1.20     PTT - ( 02 Nov 2018 11:25 )  PTT:27.9 sec    CAPILLARY BLOOD GLUCOSE  POCT Blood Glucose.: 147 mg/dL (02 Nov 2018 11:09)  POCT Blood Glucose.: 131 mg/dL (02 Nov 2018 10:08)  POCT Blood Glucose.: 122 mg/dL (02 Nov 2018 07:56)  POCT Blood Glucose.: 121 mg/dL (02 Nov 2018 07:06)  POCT Blood Glucose.: 119 mg/dL (02 Nov 2018 06:15)  POCT Blood Glucose.: 111 mg/dL (02 Nov 2018 05:03)  POCT Blood Glucose.: 109 mg/dL (02 Nov 2018 04:03)  POCT Blood Glucose.: 135 mg/dL (02 Nov 2018 02:26)  POCT Blood Glucose.: 142 mg/dL (02 Nov 2018 01:11)  POCT Blood Glucose.: 116 mg/dL (01 Nov 2018 23:56)  POCT Blood Glucose.: 113 mg/dL (01 Nov 2018 23:04)  POCT Blood Glucose.: 131 mg/dL (01 Nov 2018 22:18)  POCT Blood Glucose.: 92 mg/dL (01 Nov 2018 20:58)  POCT Blood Glucose.: 121 mg/dL (01 Nov 2018 19:32)  POCT Blood Glucose.: 146 mg/dL (01 Nov 2018 18:32)  POCT Blood Glucose.: 164 mg/dL (01 Nov 2018 17:20)  POCT Blood Glucose.: 170 mg/dL (01 Nov 2018 16:28)  POCT Blood Glucose.: 169 mg/dL (01 Nov 2018 15:47)  POCT Blood Glucose.: 154 mg/dL (01 Nov 2018 14:26)    LIVER FUNCTIONS - ( 02 Nov 2018 11:25 )  Alb: 4.1 g/dL / Pro: 6.0 g/dL / ALK PHOS: 38 U/L / ALT: 8 U/L / AST: 43 U/L / GGT: x

## 2018-11-02 NOTE — PHYSICAL THERAPY INITIAL EVALUATION ADULT - GENERAL OBSERVATIONS, REHAB EVAL
patient received seated out of bed with no acute distress. +EKG, +chest tube to wall suction, +chandni, +central line, +NC 3L/min, +temporary pacemaker, +meyer, +SCDs

## 2018-11-02 NOTE — PHYSICAL THERAPY INITIAL EVALUATION ADULT - GAIT DEVIATIONS NOTED, PT EVAL
multiple standing rest breaks due to complaint of pain, verbal cues to increase depth of inspiration, fairly steady

## 2018-11-02 NOTE — PROGRESS NOTE ADULT - SUBJECTIVE AND OBJECTIVE BOX
Pt is s/p CABS   LIMA  to LAD   SVG to PDA  SVG to OM   doing well post op    OOB in Chair     PAST MEDICAL & SURGICAL HISTORY:  HLD (hyperlipidemia)  CKD (chronic kidney disease) stage 3, GFR 30-59 ml/min  HTN (hypertension)  DM (diabetes mellitus)  H/O eye surgery  H/O tubal ligation    ICU Vital Signs Last 24 Hrs  T(C): 36.8 (02 Nov 2018 05:01), Max: 38.1 (02 Nov 2018 01:00)  T(F): 98.2 (02 Nov 2018 05:01), Max: 100.6 (02 Nov 2018 01:00)  HR: 60 (02 Nov 2018 08:00) (60 - 82)  BP: 122/56 (02 Nov 2018 08:00) (111/56 - 154/63)  BP(mean): 82 (02 Nov 2018 08:00) (79 - 109)  ABP: 118/46 (02 Nov 2018 08:00) (118/46 - 164/62)  ABP(mean): 68 (02 Nov 2018 08:00) (68 - 100)  RR: 14 (02 Nov 2018 08:00) (7 - 23)  SpO2: 100% (02 Nov 2018 08:00) (99% - 100%)      MEDICATIONS  (STANDING):  aspirin enteric coated 81 milliGRAM(s) Oral daily  atorvastatin 80 milliGRAM(s) Oral at bedtime  ceFAZolin   IVPB 2000 milliGRAM(s) IV Intermittent every 8 hours  chlorhexidine 0.12% Liquid 5 milliLiter(s) Oral Mucosa every 4 hours  chlorhexidine 2% Cloths 1 Application(s) Topical daily  clopidogrel Tablet 75 milliGRAM(s) Oral daily  dextrose 50% Injectable 50 milliLiter(s) IV Push every 15 minutes  dextrose 50% Injectable 25 milliLiter(s) IV Push every 15 minutes  heparin  Injectabl  e 5000 Unit(s) SubCutaneous every 8 hours  insulin Infusion 1 Unit(s)/Hr (1 mL/Hr) IV Continuous <Continuous>  pantoprazole  Injectable 40 milliGRAM(s) IV Push daily  phenylephrine    Infusion 0.2 MICROgram(s)/kG/Min (4.515 mL/Hr) IV Continuous <Continuous>  sodium chloride 0.9%. 1000 milliLiter(s) (10 mL/Hr) IV Continuous <Continuous>    Lungs decreased breath sounds at bases'  chest tube in place  CV s1 s2  Abd soft  Ext stable    Ortiz in place                          11.1   8.6   )-----------( 103      ( 02 Nov 2018 02:36 )             32.6   11-02    138  |  100  |  19  ----------------------------<  139<H>  4.1   |  22  |  1.19    Ca    8.9      02 Nov 2018 02:36  Phos  3.1     11-02  Mg     1.8     11-02    TPro  5.8<L>  /  Alb  3.6  /  TBili  0.3  /  DBili  x   /  AST  47<H>  /  ALT  10  /  AlkPhos  36<L>  11-02

## 2018-11-02 NOTE — PROGRESS NOTE ADULT - SUBJECTIVE AND OBJECTIVE BOX
INTERVAL HPI/OVERNIGHT EVENTS:    Patient is a 68y old  Female who presents with a chief complaint of chest pain  Pt is now POD #1 from CABG.   She was started on insulin infusion post operatively.   She was taken off this morning with 10 units NPH insulin.   She has poor PO intake at this time, decreased appetite.   She reports some incisional chest pain but otherwise feels well.       Pt reports the following symptoms:    CONSTITUTIONAL:  Negative fever or chills, feels well, good appetite  EYES:  Negative  blurry vision or double vision  CARDIOVASCULAR:  Negative for chest pain or palpitations  RESPIRATORY:  Negative for cough, wheezing, or SOB   GASTROINTESTINAL:  Negative for nausea, vomiting, diarrhea, constipation, or abdominal pain  GENITOURINARY:  Negative frequency, urgency or dysuria  NEUROLOGIC:  No headache, confusion, dizziness, lightheadedness    MEDICATIONS  (STANDING):  aspirin enteric coated 81 milliGRAM(s) Oral daily  atorvastatin 80 milliGRAM(s) Oral at bedtime  ceFAZolin   IVPB 2000 milliGRAM(s) IV Intermittent every 8 hours  chlorhexidine 0.12% Liquid 5 milliLiter(s) Oral Mucosa every 4 hours  chlorhexidine 2% Cloths 1 Application(s) Topical daily  clopidogrel Tablet 75 milliGRAM(s) Oral daily  dextrose 5%. 1000 milliLiter(s) (50 mL/Hr) IV Continuous <Continuous>  dextrose 50% Injectable 12.5 Gram(s) IV Push once  dextrose 50% Injectable 25 Gram(s) IV Push once  dextrose 50% Injectable 25 Gram(s) IV Push once  dextrose 50% Injectable 50 milliLiter(s) IV Push every 15 minutes  dextrose 50% Injectable 25 milliLiter(s) IV Push every 15 minutes  heparin  Injectable 5000 Unit(s) SubCutaneous every 8 hours  insulin glargine Injectable (LANTUS) 8 Unit(s) SubCutaneous at bedtime  insulin Infusion 1 Unit(s)/Hr (1 mL/Hr) IV Continuous <Continuous>  insulin lispro (HumaLOG) corrective regimen sliding scale   SubCutaneous Before meals and at bedtime  pantoprazole    Tablet 40 milliGRAM(s) Oral before breakfast  phenylephrine    Infusion 0.2 MICROgram(s)/kG/Min (4.515 mL/Hr) IV Continuous <Continuous>  sodium chloride 0.9%. 1000 milliLiter(s) (10 mL/Hr) IV Continuous <Continuous>    MEDICATIONS  (PRN):  acetaminophen   Tablet .. 650 milliGRAM(s) Oral every 6 hours PRN Mild Pain (1 - 3)  dextrose 40% Gel 15 Gram(s) Oral once PRN Blood Glucose LESS THAN 70 milliGRAM(s)/deciliter  glucagon  Injectable 1 milliGRAM(s) IntraMuscular once PRN Glucose LESS THAN 70 milligrams/deciliter  oxyCODONE    5 mG/acetaminophen 325 mG 1 Tablet(s) Oral every 4 hours PRN Moderate Pain (4 - 6)  oxyCODONE    5 mG/acetaminophen 325 mG 2 Tablet(s) Oral every 4 hours PRN Severe Pain (7 - 10)  potassium chloride  20 mEq/100 mL IVPB 20 milliEquivalent(s) IV Intermittent every 1 hour PRN K<4.0      PHYSICAL EXAM  Vital Signs Last 24 Hrs  T(C): 36.8 (02 Nov 2018 05:01), Max: 38.1 (02 Nov 2018 01:00)  T(F): 98.2 (02 Nov 2018 05:01), Max: 100.6 (02 Nov 2018 01:00)  HR: 68 (02 Nov 2018 13:00) (60 - 74)  BP: 137/54 (02 Nov 2018 13:00) (111/56 - 154/63)  BP(mean): 82 (02 Nov 2018 13:00) (75 - 109)  RR: 21 (02 Nov 2018 13:00) (7 - 23)  SpO2: 92% (02 Nov 2018 13:00) (92% - 100%)    Constitutional: wn/wd in NAD.   HEENT: NCAT, MMM, OP clear, EOMI, no proptosis or lid retraction  Neck: no thyromegaly or palpable thyroid nodules   Respiratory: lungs CTAB.  Cardiovascular: regular rhythm, normal S1 and S2, no audible murmurs, no peripheral edema  GI: soft, NT/ND, no masses/HSM appreciated.  Neurology: no tremors, DTR 2+  Skin: no visible rashes/lesions  Psychiatric: AAO x 3, normal affect/mood.    LABS:                        10.6   8.9   )-----------( 112      ( 02 Nov 2018 11:25 )             31.4     11-02    137  |  100  |  20  ----------------------------<  156<H>  4.8   |  24  |  1.43<H>    Ca    9.4      02 Nov 2018 11:25  Phos  3.1     11-02  Mg     2.7     11-02    TPro  6.0  /  Alb  4.1  /  TBili  0.4  /  DBili  <0.2  /  AST  43<H>  /  ALT  8<L>  /  AlkPhos  38<L>  11-02    PT/INR - ( 02 Nov 2018 11:25 )   PT: 13.6 sec;   INR: 1.20          PTT - ( 02 Nov 2018 11:25 )  PTT:27.9 sec    Thyroid Stimulating Hormone, Serum: 1.866 uIU/mL (10-30 @ 05:51)      HbA1C: 8.3 % (10-30 @ 05:51)    CAPILLARY BLOOD GLUCOSE      POCT Blood Glucose.: 147 mg/dL (02 Nov 2018 11:09)  POCT Blood Glucose.: 131 mg/dL (02 Nov 2018 10:08)  POCT Blood Glucose.: 122 mg/dL (02 Nov 2018 07:56)  POCT Blood Glucose.: 121 mg/dL (02 Nov 2018 07:06)  POCT Blood Glucose.: 119 mg/dL (02 Nov 2018 06:15)  POCT Blood Glucose.: 111 mg/dL (02 Nov 2018 05:03)  POCT Blood Glucose.: 109 mg/dL (02 Nov 2018 04:03)  POCT Blood Glucose.: 135 mg/dL (02 Nov 2018 02:26)  POCT Blood Glucose.: 142 mg/dL (02 Nov 2018 01:11)  POCT Blood Glucose.: 116 mg/dL (01 Nov 2018 23:56)  POCT Blood Glucose.: 113 mg/dL (01 Nov 2018 23:04)  POCT Blood Glucose.: 131 mg/dL (01 Nov 2018 22:18)  POCT Blood Glucose.: 92 mg/dL (01 Nov 2018 20:58)  POCT Blood Glucose.: 121 mg/dL (01 Nov 2018 19:32)  POCT Blood Glucose.: 146 mg/dL (01 Nov 2018 18:32)  POCT Blood Glucose.: 164 mg/dL (01 Nov 2018 17:20)  POCT Blood Glucose.: 170 mg/dL (01 Nov 2018 16:28)  POCT Blood Glucose.: 169 mg/dL (01 Nov 2018 15:47)  POCT Blood Glucose.: 154 mg/dL (01 Nov 2018 14:26)

## 2018-11-02 NOTE — CONSULT NOTE ADULT - ASSESSMENT
67yo patient of Dr. Villaseñor's with known L carotid disease, with a PMH of CAD, HTN, HLD, DM, CKDIII who presented to [facility] with chest pain, and is now s/p CABG on 11/2.    - Minimal L hand weakness, otherwise sensation/motor grossly intact  - Will continue to follow post-operatively

## 2018-11-03 LAB
ANION GAP SERPL CALC-SCNC: 15 MMOL/L — SIGNIFICANT CHANGE UP (ref 5–17)
APTT BLD: 33.7 SEC — SIGNIFICANT CHANGE UP (ref 27.5–36.3)
BUN SERPL-MCNC: 24 MG/DL — HIGH (ref 7–23)
CALCIUM SERPL-MCNC: 9.4 MG/DL — SIGNIFICANT CHANGE UP (ref 8.4–10.5)
CHLORIDE SERPL-SCNC: 98 MMOL/L — SIGNIFICANT CHANGE UP (ref 96–108)
CO2 SERPL-SCNC: 22 MMOL/L — SIGNIFICANT CHANGE UP (ref 22–31)
CREAT SERPL-MCNC: 1.48 MG/DL — HIGH (ref 0.5–1.3)
GLUCOSE BLDC GLUCOMTR-MCNC: 164 MG/DL — HIGH (ref 70–99)
GLUCOSE BLDC GLUCOMTR-MCNC: 167 MG/DL — HIGH (ref 70–99)
GLUCOSE BLDC GLUCOMTR-MCNC: 198 MG/DL — HIGH (ref 70–99)
GLUCOSE BLDC GLUCOMTR-MCNC: 212 MG/DL — HIGH (ref 70–99)
GLUCOSE BLDC GLUCOMTR-MCNC: 214 MG/DL — HIGH (ref 70–99)
GLUCOSE SERPL-MCNC: 224 MG/DL — HIGH (ref 70–99)
HCT VFR BLD CALC: 30.4 % — LOW (ref 34.5–45)
HGB BLD-MCNC: 10 G/DL — LOW (ref 11.5–15.5)
INR BLD: 1.2 — HIGH (ref 0.88–1.16)
MAGNESIUM SERPL-MCNC: 2 MG/DL — SIGNIFICANT CHANGE UP (ref 1.6–2.6)
MCHC RBC-ENTMCNC: 27.5 PG — SIGNIFICANT CHANGE UP (ref 27–34)
MCHC RBC-ENTMCNC: 32.9 G/DL — SIGNIFICANT CHANGE UP (ref 32–36)
MCV RBC AUTO: 83.7 FL — SIGNIFICANT CHANGE UP (ref 80–100)
PHOSPHATE SERPL-MCNC: 3 MG/DL — SIGNIFICANT CHANGE UP (ref 2.5–4.5)
PLATELET # BLD AUTO: 105 K/UL — LOW (ref 150–400)
POTASSIUM SERPL-MCNC: 4.7 MMOL/L — SIGNIFICANT CHANGE UP (ref 3.5–5.3)
POTASSIUM SERPL-SCNC: 4.7 MMOL/L — SIGNIFICANT CHANGE UP (ref 3.5–5.3)
PROTHROM AB SERPL-ACNC: 13.6 SEC — HIGH (ref 10–12.9)
RBC # BLD: 3.63 M/UL — LOW (ref 3.8–5.2)
RBC # FLD: 14 % — SIGNIFICANT CHANGE UP (ref 10.3–16.9)
SODIUM SERPL-SCNC: 135 MMOL/L — SIGNIFICANT CHANGE UP (ref 135–145)
WBC # BLD: 9.6 K/UL — SIGNIFICANT CHANGE UP (ref 3.8–10.5)
WBC # FLD AUTO: 9.6 K/UL — SIGNIFICANT CHANGE UP (ref 3.8–10.5)

## 2018-11-03 PROCEDURE — 71045 X-RAY EXAM CHEST 1 VIEW: CPT | Mod: 26

## 2018-11-03 RX ORDER — DOCUSATE SODIUM 100 MG
100 CAPSULE ORAL THREE TIMES A DAY
Qty: 0 | Refills: 0 | Status: DISCONTINUED | OUTPATIENT
Start: 2018-11-03 | End: 2018-11-05

## 2018-11-03 RX ORDER — METOPROLOL TARTRATE 50 MG
12.5 TABLET ORAL EVERY 6 HOURS
Qty: 0 | Refills: 0 | Status: DISCONTINUED | OUTPATIENT
Start: 2018-11-03 | End: 2018-11-04

## 2018-11-03 RX ORDER — METOPROLOL TARTRATE 50 MG
12.5 TABLET ORAL
Qty: 0 | Refills: 0 | Status: DISCONTINUED | OUTPATIENT
Start: 2018-11-03 | End: 2018-11-03

## 2018-11-03 RX ORDER — INSULIN GLARGINE 100 [IU]/ML
10 INJECTION, SOLUTION SUBCUTANEOUS AT BEDTIME
Qty: 0 | Refills: 0 | Status: DISCONTINUED | OUTPATIENT
Start: 2018-11-03 | End: 2018-11-05

## 2018-11-03 RX ORDER — INSULIN LISPRO 100/ML
3 VIAL (ML) SUBCUTANEOUS
Qty: 0 | Refills: 0 | Status: DISCONTINUED | OUTPATIENT
Start: 2018-11-03 | End: 2018-11-05

## 2018-11-03 RX ORDER — SODIUM CHLORIDE 9 MG/ML
3 INJECTION INTRAMUSCULAR; INTRAVENOUS; SUBCUTANEOUS EVERY 8 HOURS
Qty: 0 | Refills: 0 | Status: DISCONTINUED | OUTPATIENT
Start: 2018-11-03 | End: 2018-11-05

## 2018-11-03 RX ORDER — FAMOTIDINE 10 MG/ML
20 INJECTION INTRAVENOUS DAILY
Qty: 0 | Refills: 0 | Status: DISCONTINUED | OUTPATIENT
Start: 2018-11-03 | End: 2018-11-05

## 2018-11-03 RX ADMIN — HEPARIN SODIUM 5000 UNIT(S): 5000 INJECTION INTRAVENOUS; SUBCUTANEOUS at 22:39

## 2018-11-03 RX ADMIN — INSULIN GLARGINE 10 UNIT(S): 100 INJECTION, SOLUTION SUBCUTANEOUS at 22:40

## 2018-11-03 RX ADMIN — ATORVASTATIN CALCIUM 80 MILLIGRAM(S): 80 TABLET, FILM COATED ORAL at 22:38

## 2018-11-03 RX ADMIN — Medication 650 MILLIGRAM(S): at 07:50

## 2018-11-03 RX ADMIN — Medication 2: at 07:49

## 2018-11-03 RX ADMIN — PANTOPRAZOLE SODIUM 40 MILLIGRAM(S): 20 TABLET, DELAYED RELEASE ORAL at 07:23

## 2018-11-03 RX ADMIN — Medication 81 MILLIGRAM(S): at 12:38

## 2018-11-03 RX ADMIN — FAMOTIDINE 20 MILLIGRAM(S): 10 INJECTION INTRAVENOUS at 12:41

## 2018-11-03 RX ADMIN — HEPARIN SODIUM 5000 UNIT(S): 5000 INJECTION INTRAVENOUS; SUBCUTANEOUS at 07:23

## 2018-11-03 RX ADMIN — CLOPIDOGREL BISULFATE 75 MILLIGRAM(S): 75 TABLET, FILM COATED ORAL at 12:38

## 2018-11-03 RX ADMIN — Medication 650 MILLIGRAM(S): at 08:00

## 2018-11-03 RX ADMIN — Medication 650 MILLIGRAM(S): at 17:41

## 2018-11-03 RX ADMIN — HEPARIN SODIUM 5000 UNIT(S): 5000 INJECTION INTRAVENOUS; SUBCUTANEOUS at 14:26

## 2018-11-03 RX ADMIN — CHLORHEXIDINE GLUCONATE 5 MILLILITER(S): 213 SOLUTION TOPICAL at 22:39

## 2018-11-03 RX ADMIN — CHLORHEXIDINE GLUCONATE 5 MILLILITER(S): 213 SOLUTION TOPICAL at 02:35

## 2018-11-03 RX ADMIN — Medication 100 MILLIGRAM(S): at 14:26

## 2018-11-03 RX ADMIN — Medication 4: at 22:48

## 2018-11-03 RX ADMIN — Medication 4: at 12:29

## 2018-11-03 RX ADMIN — SODIUM CHLORIDE 3 MILLILITER(S): 9 INJECTION INTRAMUSCULAR; INTRAVENOUS; SUBCUTANEOUS at 23:33

## 2018-11-03 RX ADMIN — SODIUM CHLORIDE 3 MILLILITER(S): 9 INJECTION INTRAMUSCULAR; INTRAVENOUS; SUBCUTANEOUS at 13:07

## 2018-11-03 RX ADMIN — Medication 12.5 MILLIGRAM(S): at 17:02

## 2018-11-03 RX ADMIN — Medication 650 MILLIGRAM(S): at 00:00

## 2018-11-03 RX ADMIN — CHLORHEXIDINE GLUCONATE 5 MILLILITER(S): 213 SOLUTION TOPICAL at 07:22

## 2018-11-03 RX ADMIN — Medication 100 MILLIGRAM(S): at 22:38

## 2018-11-03 RX ADMIN — Medication 2: at 16:53

## 2018-11-03 RX ADMIN — Medication 3 UNIT(S): at 16:53

## 2018-11-03 NOTE — PROGRESS NOTE ADULT - SUBJECTIVE AND OBJECTIVE BOX
s/p CABS  for LM diseased and Triple Vessel CAD     PAST MEDICAL & SURGICAL HISTORY:  HLD (hyperlipidemia)  CKD (chronic kidney disease) stage 3, GFR 30-59 ml/min  HTN (hypertension)  DM (diabetes mellitus)  H/O eye surgery  H/O tubal ligation    MEDICATIONS  (STANDING):  aspirin enteric coated 81 milliGRAM(s) Oral daily  atorvastatin 80 milliGRAM(s) Oral at bedtime  chlorhexidine 0.12% Liquid 5 milliLiter(s) Oral Mucosa every 4 hours  chlorhexidine 2% Cloths 1 Application(s) Topical daily  clopidogrel Tablet 75 milliGRAM(s) Oral daily  dextrose 5%. 1000 milliLiter(s) (50 mL/Hr) IV Continuous <Continuous>  heparin  Injectable 5000 Unit(s) SubCutaneous every 8 hours  insulin glargine Injectable (LANTUS) 8 Unit(s) SubCutaneous at bedtime  insulin lispro (HumaLOG) corrective regimen sliding scale   SubCutaneous Before meals and at bedtime  pantoprazole    Tablet 40 milliGRAM(s) Oral before breakfast  sodium chloride 0.9%. 1000 milliLiter(s) (10 mL/Hr) IV Continuous <Continuous>    MEDICATIONS  (PRN):  acetaminophen   Tablet .. 650 milliGRAM(s) Oral every 6 hours PRN Mild Pain (1 - 3)  oxyCODONE    5 mG/acetaminophen 325 mG 1 Tablet(s) Oral every 4 hours PRN Moderate Pain (4 - 6)  oxyCODONE    5 mG/acetaminophen 325 mG 2 Tablet(s) Oral every 4 hours PRN Severe Pain (7 - 10)    ICU Vital Signs Last 24 Hrs  T(C): 36.6 (03 Nov 2018 05:50), Max: 37.3 (03 Nov 2018 01:07)  T(F): 97.9 (03 Nov 2018 05:50), Max: 99.2 (03 Nov 2018 01:07)  HR: 82 (03 Nov 2018 08:00) (64 - 90)  BP: 153/60 (03 Nov 2018 07:18) (133/59 - 153/60)  BP(mean): 98 (03 Nov 2018 07:18) (82 - 107)  ABP: 132/56 (03 Nov 2018 08:00) (76/70 - 162/72)  ABP(mean): 84 (03 Nov 2018 08:00) (68 - 96)  RR: 14 (03 Nov 2018 08:00) (13 - 24)  SpO2: 92% (03 Nov 2018 08:00) (92% - 99%)      lungs decreased breath sounds at bases  Cv s1 s2  abd soft  Ext stable    CXR    Stable

## 2018-11-03 NOTE — PROGRESS NOTE ADULT - SUBJECTIVE AND OBJECTIVE BOX
INTERVAL HPI/OVERNIGHT EVENTS:    Patient is a 68y old  Female who presents with a chief complaint of chest pain   Pt now POD #2 from CABG  continued incisional chest pain, poor PO intake, no bowel movement.   Pt rec'd 8 units lantus last night,   not yet ordered for pre-meal as she has poor po intake yesterday as well.   she is receiving coverage as ordered.     Pt reports the following symptoms:    CONSTITUTIONAL:  Negative fever or chills, feels well, good appetite  EYES:  Negative  blurry vision or double vision  CARDIOVASCULAR:  Negative for chest pain or palpitations  RESPIRATORY:  Negative for cough, wheezing, or SOB   GASTROINTESTINAL:  Negative for nausea, vomiting, diarrhea, constipation, or abdominal pain  GENITOURINARY:  Negative frequency, urgency or dysuria  NEUROLOGIC:  No headache, confusion, dizziness, lightheadedness    MEDICATIONS  (STANDING):  aspirin enteric coated 81 milliGRAM(s) Oral daily  atorvastatin 80 milliGRAM(s) Oral at bedtime  chlorhexidine 0.12% Liquid 5 milliLiter(s) Oral Mucosa every 4 hours  chlorhexidine 2% Cloths 1 Application(s) Topical daily  clopidogrel Tablet 75 milliGRAM(s) Oral daily  dextrose 5%. 1000 milliLiter(s) (50 mL/Hr) IV Continuous <Continuous>  docusate sodium 100 milliGRAM(s) Oral three times a day  famotidine    Tablet 20 milliGRAM(s) Oral daily  heparin  Injectable 5000 Unit(s) SubCutaneous every 8 hours  insulin glargine Injectable (LANTUS) 8 Unit(s) SubCutaneous at bedtime  insulin lispro (HumaLOG) corrective regimen sliding scale   SubCutaneous Before meals and at bedtime  metoprolol tartrate 12.5 milliGRAM(s) Oral two times a day  pantoprazole    Tablet 40 milliGRAM(s) Oral before breakfast  sodium chloride 0.9% lock flush 3 milliLiter(s) IV Push every 8 hours  sodium chloride 0.9%. 1000 milliLiter(s) (10 mL/Hr) IV Continuous <Continuous>    MEDICATIONS  (PRN):  acetaminophen   Tablet .. 650 milliGRAM(s) Oral every 6 hours PRN Mild Pain (1 - 3)  oxyCODONE    5 mG/acetaminophen 325 mG 1 Tablet(s) Oral every 4 hours PRN Moderate Pain (4 - 6)  oxyCODONE    5 mG/acetaminophen 325 mG 2 Tablet(s) Oral every 4 hours PRN Severe Pain (7 - 10)      PHYSICAL EXAM  Vital Signs Last 24 Hrs  T(C): 37.6 (03 Nov 2018 14:00), Max: 37.6 (03 Nov 2018 14:00)  T(F): 99.6 (03 Nov 2018 14:00), Max: 99.6 (03 Nov 2018 14:00)  HR: 82 (03 Nov 2018 14:00) (64 - 90)  BP: 157/65 (03 Nov 2018 14:00) (138/57 - 157/65)  BP(mean): 84 (03 Nov 2018 11:00) (84 - 98)  RR: 14 (03 Nov 2018 14:00) (14 - 20)  SpO2: 92% (03 Nov 2018 14:00) (88% - 99%)    Constitutional: wn/wd in NAD.   HEENT: NCAT, MMM, OP clear, EOMI, no proptosis or lid retraction  Neck: no thyromegaly or palpable thyroid nodules   Respiratory: lungs CTAB.  Cardiovascular: regular rhythm, normal S1 and S2, no audible murmurs, no peripheral edema  GI: soft, NT/ND, no masses/HSM appreciated.  Neurology: no tremors, DTR 2+  Skin: no visible rashes/lesions  Psychiatric: AAO x 3, normal affect/mood.    LABS:                        10.0   9.6   )-----------( 105      ( 03 Nov 2018 05:51 )             30.4     11-03    135  |  98  |  24<H>  ----------------------------<  224<H>  4.7   |  22  |  1.48<H>    Ca    9.4      03 Nov 2018 10:46  Phos  3.0     11-03  Mg     2.0     11-03    TPro  6.0  /  Alb  4.1  /  TBili  0.4  /  DBili  <0.2  /  AST  43<H>  /  ALT  8<L>  /  AlkPhos  38<L>  11-02    PT/INR - ( 03 Nov 2018 10:23 )   PT: 13.6 sec;   INR: 1.20          PTT - ( 03 Nov 2018 10:23 )  PTT:33.7 sec    Thyroid Stimulating Hormone, Serum: 1.866 uIU/mL (10-30 @ 05:51)      HbA1C: 8.3 % (10-30 @ 05:51)    CAPILLARY BLOOD GLUCOSE      POCT Blood Glucose.: 212 mg/dL (03 Nov 2018 11:36)  POCT Blood Glucose.: 164 mg/dL (03 Nov 2018 07:38)  POCT Blood Glucose.: 167 mg/dL (03 Nov 2018 01:36)  POCT Blood Glucose.: 198 mg/dL (02 Nov 2018 16:25)    A/P: 68y Female with history of DM type II presenting for management of CAD, now s/p CABG with mild hyperglycemia

## 2018-11-03 NOTE — PROGRESS NOTE ADULT - SUBJECTIVE AND OBJECTIVE BOX
Patient discussed on morning rounds with Dr. Richmond     Operation / Date: 11/1/18 CABGx3 EF 55%    SUBJECTIVE ASSESSMENT:    Pt reports pain controlled with current rx, denies dyspnea/SOB at rest but is easily fatigued.  She is tolerating PO, denies N/V, fevers/chills.  She ambulated this morning without difficulty and was weight bearing without assistance.  She is only pulling about 500 on IS but is using it frequently.    Vital Signs Last 24 Hrs  T(C): 37.6 (03 Nov 2018 14:00), Max: 37.6 (03 Nov 2018 14:00)  T(F): 99.6 (03 Nov 2018 14:00), Max: 99.6 (03 Nov 2018 14:00)  HR: 82 (03 Nov 2018 14:00) (64 - 90)  BP: 157/65 (03 Nov 2018 14:00) (133/59 - 157/65)  BP(mean): 84 (03 Nov 2018 11:00) (84 - 98)  RR: 14 (03 Nov 2018 14:00) (13 - 20)  SpO2: 92% (03 Nov 2018 14:00) (88% - 99%)  I&O's Detail    02 Nov 2018 07:01  -  03 Nov 2018 07:00  --------------------------------------------------------  IN:    insulin Infusion: 6.5 mL    IV PiggyBack: 200 mL    phenylephrine   Infusion: 20.4 mL    phenylephrine   Infusion: 4.5 mL    sodium chloride 0.9%.: 250 mL  Total IN: 481.4 mL    OUT:    Chest Tube: 90 mL    Indwelling Catheter - Urethral: 1000 mL  Total OUT: 1090 mL    Total NET: -608.6 mL      03 Nov 2018 08:01  -  03 Nov 2018 14:11  --------------------------------------------------------  IN:  Total IN: 0 mL    OUT:    Indwelling Catheter - Urethral: 40 mL  Total OUT: 40 mL    Total NET: -40 mL          CHEST TUBE:  Yes/No. AIR LEAKS: Yes/No. Suction / H2O SEAL.   LUIZ DRAIN:  Yes/No.  EPICARDIAL WIRES: Yes/No.  TIE DOWNS: Yes/No.  HUERTA: Yes/No.    PHYSICAL EXAM:    General:     Neurological:    Cardiovascular:    Respiratory:    Gastrointestinal:    Extremities:    Vascular:    Incision Sites:    LABS:                        10.0   9.6   )-----------( 105      ( 03 Nov 2018 05:51 )             30.4       COUMADIN:  Yes/No. REASON: .    PT/INR - ( 03 Nov 2018 10:23 )   PT: 13.6 sec;   INR: 1.20          PTT - ( 03 Nov 2018 10:23 )  PTT:33.7 sec    11-03    135  |  98  |  24<H>  ----------------------------<  224<H>  4.7   |  22  |  1.48<H>    Ca    9.4      03 Nov 2018 10:46  Phos  3.0     11-03  Mg     2.0     11-03    TPro  6.0  /  Alb  4.1  /  TBili  0.4  /  DBili  <0.2  /  AST  43<H>  /  ALT  8<L>  /  AlkPhos  38<L>  11-02          MEDICATIONS  (STANDING):  aspirin enteric coated 81 milliGRAM(s) Oral daily  atorvastatin 80 milliGRAM(s) Oral at bedtime  chlorhexidine 0.12% Liquid 5 milliLiter(s) Oral Mucosa every 4 hours  chlorhexidine 2% Cloths 1 Application(s) Topical daily  clopidogrel Tablet 75 milliGRAM(s) Oral daily  dextrose 5%. 1000 milliLiter(s) (50 mL/Hr) IV Continuous <Continuous>  docusate sodium 100 milliGRAM(s) Oral three times a day  famotidine    Tablet 20 milliGRAM(s) Oral daily  heparin  Injectable 5000 Unit(s) SubCutaneous every 8 hours  insulin glargine Injectable (LANTUS) 8 Unit(s) SubCutaneous at bedtime  insulin lispro (HumaLOG) corrective regimen sliding scale   SubCutaneous Before meals and at bedtime  pantoprazole    Tablet 40 milliGRAM(s) Oral before breakfast  sodium chloride 0.9% lock flush 3 milliLiter(s) IV Push every 8 hours  sodium chloride 0.9%. 1000 milliLiter(s) (10 mL/Hr) IV Continuous <Continuous>    MEDICATIONS  (PRN):  acetaminophen   Tablet .. 650 milliGRAM(s) Oral every 6 hours PRN Mild Pain (1 - 3)  oxyCODONE    5 mG/acetaminophen 325 mG 1 Tablet(s) Oral every 4 hours PRN Moderate Pain (4 - 6)  oxyCODONE    5 mG/acetaminophen 325 mG 2 Tablet(s) Oral every 4 hours PRN Severe Pain (7 - 10)        RADIOLOGY & ADDITIONAL TESTS: Patient discussed on morning rounds with Dr. Richmond     Operation / Date: 11/1/18 CABGx3 EF 55%    SUBJECTIVE ASSESSMENT:    Pt reports pain controlled with current rx, denies dyspnea/SOB at rest but is easily fatigued.  She is tolerating PO, denies N/V, fevers/chills.  She ambulated this morning without difficulty and was weight bearing without assistance.  She is only pulling about 500 on IS but is using it frequently.    Vital Signs Last 24 Hrs  T(C): 37.6 (03 Nov 2018 14:00), Max: 37.6 (03 Nov 2018 14:00)  T(F): 99.6 (03 Nov 2018 14:00), Max: 99.6 (03 Nov 2018 14:00)  HR: 82 (03 Nov 2018 14:00) (64 - 90)  BP: 157/65 (03 Nov 2018 14:00) (133/59 - 157/65)  BP(mean): 84 (03 Nov 2018 11:00) (84 - 98)  RR: 14 (03 Nov 2018 14:00) (13 - 20)  SpO2: 92% (03 Nov 2018 14:00) (88% - 99%)  I&O's Detail    02 Nov 2018 07:01  -  03 Nov 2018 07:00  --------------------------------------------------------  IN:    insulin Infusion: 6.5 mL    IV PiggyBack: 200 mL    phenylephrine   Infusion: 20.4 mL    phenylephrine   Infusion: 4.5 mL    sodium chloride 0.9%.: 250 mL  Total IN: 481.4 mL    OUT:    Chest Tube: 90 mL    Indwelling Catheter - Urethral: 1000 mL  Total OUT: 1090 mL    Total NET: -608.6 mL      03 Nov 2018 08:01  -  03 Nov 2018 14:11  --------------------------------------------------------  IN:  Total IN: 0 mL    OUT:    Indwelling Catheter - Urethral: 40 mL  Total OUT: 40 mL    Total NET: -40 mL          CHEST TUBE:  No.  LUIZ DRAIN:  No.  EPICARDIAL WIRES: Yes.  TIE DOWNS: Yes.  HUERTA: No.    PHYSICAL EXAM:    General: NAD    Neurological: A&Ox3    Cardiovascular: S1S2 RRR    Respiratory: decreased BS in b/l lower lobes, no W/R/R    Gastrointestinal: soft NT/ND +BS    Extremities: no edema    Vascular: warm and well perfused bilaterally    Incision Sites: MSI with dressing C/D/I, L EVH site C/D/I    LABS:                        10.0   9.6   )-----------( 105      ( 03 Nov 2018 05:51 )             30.4       COUMADIN:  No.     PT/INR - ( 03 Nov 2018 10:23 )   PT: 13.6 sec;   INR: 1.20          PTT - ( 03 Nov 2018 10:23 )  PTT:33.7 sec    11-03    135  |  98  |  24<H>  ----------------------------<  224<H>  4.7   |  22  |  1.48<H>    Ca    9.4      03 Nov 2018 10:46  Phos  3.0     11-03  Mg     2.0     11-03    TPro  6.0  /  Alb  4.1  /  TBili  0.4  /  DBili  <0.2  /  AST  43<H>  /  ALT  8<L>  /  AlkPhos  38<L>  11-02    MEDICATIONS  (STANDING):  aspirin enteric coated 81 milliGRAM(s) Oral daily  atorvastatin 80 milliGRAM(s) Oral at bedtime  chlorhexidine 0.12% Liquid 5 milliLiter(s) Oral Mucosa every 4 hours  chlorhexidine 2% Cloths 1 Application(s) Topical daily  clopidogrel Tablet 75 milliGRAM(s) Oral daily  dextrose 5%. 1000 milliLiter(s) (50 mL/Hr) IV Continuous <Continuous>  docusate sodium 100 milliGRAM(s) Oral three times a day  famotidine    Tablet 20 milliGRAM(s) Oral daily  heparin  Injectable 5000 Unit(s) SubCutaneous every 8 hours  insulin glargine Injectable (LANTUS) 8 Unit(s) SubCutaneous at bedtime  insulin lispro (HumaLOG) corrective regimen sliding scale   SubCutaneous Before meals and at bedtime  pantoprazole    Tablet 40 milliGRAM(s) Oral before breakfast  sodium chloride 0.9% lock flush 3 milliLiter(s) IV Push every 8 hours  sodium chloride 0.9%. 1000 milliLiter(s) (10 mL/Hr) IV Continuous <Continuous>    MEDICATIONS  (PRN):  acetaminophen   Tablet .. 650 milliGRAM(s) Oral every 6 hours PRN Mild Pain (1 - 3)  oxyCODONE    5 mG/acetaminophen 325 mG 1 Tablet(s) Oral every 4 hours PRN Moderate Pain (4 - 6)  oxyCODONE    5 mG/acetaminophen 325 mG 2 Tablet(s) Oral every 4 hours PRN Severe Pain (7 - 10)        RADIOLOGY & ADDITIONAL TESTS:    CXR: L basilar effusion vs atelectasis, no signif PVC

## 2018-11-03 NOTE — PROGRESS NOTE ADULT - ASSESSMENT
A/P: 69 y/o F with  PMHx of CAD, HTN, HLD, DM II, and CKD stage 3 s/p CABGx3 on 11/1/18    Neurovascular: No delirium, pain well managed on current regimen, cirtical L ICA stenosis  -permissive HTN, SBP>130  -C/w PRNs for Pain control  -Monitor neuro status    Respiratory: Saturates low 90's on room air     -AM CXR stable, repeat in AM  -Encourage IS 10x/hour while awake, Cough and deep breathing exercises  -Monitor respiratory status via SpO2    Cardiovascular: s/p CABG EF 55%  -started low dose beta blocker  -Monitor HR/BP/Tele    GI: Tolerating PO  -Prophylaxis: Protonix / Pepcid  -C/w bowel regimen    /Renal:   -BUN/Cr:   -Trend Cr on AM labs  -Replete electrolytes as needed    ID: Afebrile, asymptomatic  -WCC:  -Continue to monitor for SIRS/Sepsis syndrome while inpatient    Endo:  -A1C:  -TSH:    Heme:   -H/H:  -CBC, chem in AM  -DVT ppx: HSQ 5000 / 7500 u q8h / q12h and SCDs    Disposition: Home when medically appropriate. A/P: 69 y/o F with  PMHx of CAD, HTN, HLD, DM II, and CKD stage 3 s/p CABGx3 on 11/1/18    Neurovascular: No delirium, pain well managed on current regimen, cirtical L ICA stenosis  -permissive HTN, SBP>130  -C/w PRNs for Pain control  -Monitor neuro status    Respiratory: Saturates low 90's on room air     -AM CXR stable, repeat in AM  -Encourage IS 10x/hour while awake, Cough and deep breathing exercises  -Monitor respiratory status via SpO2    Cardiovascular: s/p CABG EF 55%  -started low dose beta blocker  -c/w aspirin 81 and plavix 75  -c/w lipitor 80  -Monitor HR/BP/Tele    GI: Tolerating PO  -Prophylaxis: Protonix  -C/w bowel regimen    /Renal: CRI   -BUN/Cr: 24/1.48  -Trend Cr on AM labs  -Replete electrolytes as needed    ID: Afebrile, asymptomatic  -WCC: 9.6  -Continue to monitor for SIRS/Sepsis syndrome while inpatient    Endo: DMII, Edocrinology recs appreciated  -A1C:8.3  -TSH: 1.86  -c/w lanuts 10, added prandial lispro 3, c/w lispro SS    Heme:   -H/H:10/30.4  -CBC, chem in AM  -DVT ppx: HSQ 5000 u q8h and SCDs    Disposition: Home when medically appropriate.

## 2018-11-04 LAB
ANION GAP SERPL CALC-SCNC: 14 MMOL/L — SIGNIFICANT CHANGE UP (ref 5–17)
APPEARANCE UR: CLEAR — SIGNIFICANT CHANGE UP
BASOPHILS NFR BLD AUTO: 0.2 % — SIGNIFICANT CHANGE UP (ref 0–2)
BILIRUB UR-MCNC: NEGATIVE — SIGNIFICANT CHANGE UP
BUN SERPL-MCNC: 23 MG/DL — SIGNIFICANT CHANGE UP (ref 7–23)
CALCIUM SERPL-MCNC: 9.2 MG/DL — SIGNIFICANT CHANGE UP (ref 8.4–10.5)
CHLORIDE SERPL-SCNC: 96 MMOL/L — SIGNIFICANT CHANGE UP (ref 96–108)
CO2 SERPL-SCNC: 23 MMOL/L — SIGNIFICANT CHANGE UP (ref 22–31)
COLOR SPEC: YELLOW — SIGNIFICANT CHANGE UP
CREAT SERPL-MCNC: 1.22 MG/DL — SIGNIFICANT CHANGE UP (ref 0.5–1.3)
DIFF PNL FLD: ABNORMAL
EOSINOPHIL NFR BLD AUTO: 1.2 % — SIGNIFICANT CHANGE UP (ref 0–6)
GLUCOSE BLDC GLUCOMTR-MCNC: 133 MG/DL — HIGH (ref 70–99)
GLUCOSE BLDC GLUCOMTR-MCNC: 142 MG/DL — HIGH (ref 70–99)
GLUCOSE BLDC GLUCOMTR-MCNC: 183 MG/DL — HIGH (ref 70–99)
GLUCOSE BLDC GLUCOMTR-MCNC: 211 MG/DL — HIGH (ref 70–99)
GLUCOSE SERPL-MCNC: 138 MG/DL — HIGH (ref 70–99)
GLUCOSE UR QL: NEGATIVE — SIGNIFICANT CHANGE UP
HCT VFR BLD CALC: 29.5 % — LOW (ref 34.5–45)
HGB BLD-MCNC: 9.7 G/DL — LOW (ref 11.5–15.5)
KETONES UR-MCNC: NEGATIVE — SIGNIFICANT CHANGE UP
LEUKOCYTE ESTERASE UR-ACNC: NEGATIVE — SIGNIFICANT CHANGE UP
LYMPHOCYTES # BLD AUTO: 22.8 % — SIGNIFICANT CHANGE UP (ref 13–44)
MAGNESIUM SERPL-MCNC: 1.8 MG/DL — SIGNIFICANT CHANGE UP (ref 1.6–2.6)
MCHC RBC-ENTMCNC: 27.7 PG — SIGNIFICANT CHANGE UP (ref 27–34)
MCHC RBC-ENTMCNC: 32.9 G/DL — SIGNIFICANT CHANGE UP (ref 32–36)
MCV RBC AUTO: 84.3 FL — SIGNIFICANT CHANGE UP (ref 80–100)
MONOCYTES NFR BLD AUTO: 10.5 % — SIGNIFICANT CHANGE UP (ref 2–14)
NEUTROPHILS NFR BLD AUTO: 65.3 % — SIGNIFICANT CHANGE UP (ref 43–77)
NITRITE UR-MCNC: NEGATIVE — SIGNIFICANT CHANGE UP
PH UR: 6 — SIGNIFICANT CHANGE UP (ref 5–8)
PLATELET # BLD AUTO: 134 K/UL — LOW (ref 150–400)
POTASSIUM SERPL-MCNC: 4.1 MMOL/L — SIGNIFICANT CHANGE UP (ref 3.5–5.3)
POTASSIUM SERPL-SCNC: 4.1 MMOL/L — SIGNIFICANT CHANGE UP (ref 3.5–5.3)
PROT UR-MCNC: 30 MG/DL
RBC # BLD: 3.5 M/UL — LOW (ref 3.8–5.2)
RBC # FLD: 13.8 % — SIGNIFICANT CHANGE UP (ref 10.3–16.9)
SODIUM SERPL-SCNC: 133 MMOL/L — LOW (ref 135–145)
SP GR SPEC: 1.01 — SIGNIFICANT CHANGE UP (ref 1–1.03)
UROBILINOGEN FLD QL: 0.2 E.U./DL — SIGNIFICANT CHANGE UP
WBC # BLD: 9.8 K/UL — SIGNIFICANT CHANGE UP (ref 3.8–10.5)
WBC # FLD AUTO: 9.8 K/UL — SIGNIFICANT CHANGE UP (ref 3.8–10.5)

## 2018-11-04 PROCEDURE — 71046 X-RAY EXAM CHEST 2 VIEWS: CPT | Mod: 26

## 2018-11-04 PROCEDURE — 71045 X-RAY EXAM CHEST 1 VIEW: CPT | Mod: 26,59

## 2018-11-04 RX ORDER — FUROSEMIDE 40 MG
40 TABLET ORAL ONCE
Qty: 0 | Refills: 0 | Status: COMPLETED | OUTPATIENT
Start: 2018-11-04 | End: 2018-11-04

## 2018-11-04 RX ORDER — FUROSEMIDE 40 MG
20 TABLET ORAL DAILY
Qty: 0 | Refills: 0 | Status: DISCONTINUED | OUTPATIENT
Start: 2018-11-04 | End: 2018-11-05

## 2018-11-04 RX ORDER — POTASSIUM CHLORIDE 20 MEQ
10 PACKET (EA) ORAL DAILY
Qty: 0 | Refills: 0 | Status: DISCONTINUED | OUTPATIENT
Start: 2018-11-04 | End: 2018-11-05

## 2018-11-04 RX ORDER — LIDOCAINE 4 G/100G
1 CREAM TOPICAL DAILY
Qty: 0 | Refills: 0 | Status: DISCONTINUED | OUTPATIENT
Start: 2018-11-04 | End: 2018-11-05

## 2018-11-04 RX ORDER — METOPROLOL TARTRATE 50 MG
25 TABLET ORAL EVERY 6 HOURS
Qty: 0 | Refills: 0 | Status: DISCONTINUED | OUTPATIENT
Start: 2018-11-04 | End: 2018-11-04

## 2018-11-04 RX ORDER — TRAMADOL HYDROCHLORIDE 50 MG/1
25 TABLET ORAL EVERY 6 HOURS
Qty: 0 | Refills: 0 | Status: DISCONTINUED | OUTPATIENT
Start: 2018-11-04 | End: 2018-11-05

## 2018-11-04 RX ORDER — MAGNESIUM OXIDE 400 MG ORAL TABLET 241.3 MG
400 TABLET ORAL ONCE
Qty: 0 | Refills: 0 | Status: COMPLETED | OUTPATIENT
Start: 2018-11-04 | End: 2018-11-04

## 2018-11-04 RX ORDER — METOPROLOL TARTRATE 50 MG
37.5 TABLET ORAL EVERY 6 HOURS
Qty: 0 | Refills: 0 | Status: DISCONTINUED | OUTPATIENT
Start: 2018-11-04 | End: 2018-11-05

## 2018-11-04 RX ADMIN — LIDOCAINE 1 PATCH: 4 CREAM TOPICAL at 22:55

## 2018-11-04 RX ADMIN — CLOPIDOGREL BISULFATE 75 MILLIGRAM(S): 75 TABLET, FILM COATED ORAL at 10:29

## 2018-11-04 RX ADMIN — Medication 40 MILLIGRAM(S): at 10:29

## 2018-11-04 RX ADMIN — Medication 25 MILLIGRAM(S): at 12:31

## 2018-11-04 RX ADMIN — Medication 100 MILLIGRAM(S): at 22:51

## 2018-11-04 RX ADMIN — LIDOCAINE 1 PATCH: 4 CREAM TOPICAL at 17:11

## 2018-11-04 RX ADMIN — MAGNESIUM OXIDE 400 MG ORAL TABLET 400 MILLIGRAM(S): 241.3 TABLET ORAL at 10:29

## 2018-11-04 RX ADMIN — SODIUM CHLORIDE 3 MILLILITER(S): 9 INJECTION INTRAMUSCULAR; INTRAVENOUS; SUBCUTANEOUS at 14:43

## 2018-11-04 RX ADMIN — Medication 37.5 MILLIGRAM(S): at 17:33

## 2018-11-04 RX ADMIN — HEPARIN SODIUM 5000 UNIT(S): 5000 INJECTION INTRAVENOUS; SUBCUTANEOUS at 06:05

## 2018-11-04 RX ADMIN — Medication 12.5 MILLIGRAM(S): at 00:52

## 2018-11-04 RX ADMIN — TRAMADOL HYDROCHLORIDE 25 MILLIGRAM(S): 50 TABLET ORAL at 10:28

## 2018-11-04 RX ADMIN — FAMOTIDINE 20 MILLIGRAM(S): 10 INJECTION INTRAVENOUS at 10:28

## 2018-11-04 RX ADMIN — Medication 10 MILLIEQUIVALENT(S): at 10:28

## 2018-11-04 RX ADMIN — TRAMADOL HYDROCHLORIDE 25 MILLIGRAM(S): 50 TABLET ORAL at 20:30

## 2018-11-04 RX ADMIN — TRAMADOL HYDROCHLORIDE 25 MILLIGRAM(S): 50 TABLET ORAL at 11:08

## 2018-11-04 RX ADMIN — Medication 100 MILLIGRAM(S): at 06:06

## 2018-11-04 RX ADMIN — Medication 650 MILLIGRAM(S): at 20:30

## 2018-11-04 RX ADMIN — CHLORHEXIDINE GLUCONATE 1 APPLICATION(S): 213 SOLUTION TOPICAL at 06:36

## 2018-11-04 RX ADMIN — TRAMADOL HYDROCHLORIDE 25 MILLIGRAM(S): 50 TABLET ORAL at 17:33

## 2018-11-04 RX ADMIN — Medication 4: at 21:19

## 2018-11-04 RX ADMIN — Medication 3 UNIT(S): at 12:30

## 2018-11-04 RX ADMIN — Medication 100 MILLIGRAM(S): at 15:28

## 2018-11-04 RX ADMIN — CHLORHEXIDINE GLUCONATE 5 MILLILITER(S): 213 SOLUTION TOPICAL at 06:05

## 2018-11-04 RX ADMIN — LIDOCAINE 1 PATCH: 4 CREAM TOPICAL at 10:31

## 2018-11-04 RX ADMIN — Medication 81 MILLIGRAM(S): at 10:29

## 2018-11-04 RX ADMIN — HEPARIN SODIUM 5000 UNIT(S): 5000 INJECTION INTRAVENOUS; SUBCUTANEOUS at 15:28

## 2018-11-04 RX ADMIN — Medication 12.5 MILLIGRAM(S): at 06:05

## 2018-11-04 RX ADMIN — INSULIN GLARGINE 10 UNIT(S): 100 INJECTION, SOLUTION SUBCUTANEOUS at 22:49

## 2018-11-04 RX ADMIN — PANTOPRAZOLE SODIUM 40 MILLIGRAM(S): 20 TABLET, DELAYED RELEASE ORAL at 07:12

## 2018-11-04 RX ADMIN — ATORVASTATIN CALCIUM 80 MILLIGRAM(S): 80 TABLET, FILM COATED ORAL at 22:51

## 2018-11-04 RX ADMIN — Medication 3 UNIT(S): at 07:12

## 2018-11-04 RX ADMIN — HEPARIN SODIUM 5000 UNIT(S): 5000 INJECTION INTRAVENOUS; SUBCUTANEOUS at 22:51

## 2018-11-04 RX ADMIN — Medication 3 UNIT(S): at 17:32

## 2018-11-04 RX ADMIN — Medication 2: at 17:32

## 2018-11-04 RX ADMIN — SODIUM CHLORIDE 3 MILLILITER(S): 9 INJECTION INTRAMUSCULAR; INTRAVENOUS; SUBCUTANEOUS at 22:43

## 2018-11-04 RX ADMIN — SODIUM CHLORIDE 3 MILLILITER(S): 9 INJECTION INTRAMUSCULAR; INTRAVENOUS; SUBCUTANEOUS at 06:06

## 2018-11-04 NOTE — PROGRESS NOTE ADULT - SUBJECTIVE AND OBJECTIVE BOX
STATUS POST:  CABG    POST OPERATIVE DAY #: 3    SUBJECTIVE:  pt seen sitting in chair, without complaints at this time    MEDICATIONS  (STANDING):  aspirin enteric coated 81 milliGRAM(s) Oral daily  atorvastatin 80 milliGRAM(s) Oral at bedtime  chlorhexidine 0.12% Liquid 5 milliLiter(s) Oral Mucosa every 4 hours  chlorhexidine 2% Cloths 1 Application(s) Topical daily  clopidogrel Tablet 75 milliGRAM(s) Oral daily  dextrose 5%. 1000 milliLiter(s) (50 mL/Hr) IV Continuous <Continuous>  docusate sodium 100 milliGRAM(s) Oral three times a day  famotidine    Tablet 20 milliGRAM(s) Oral daily  furosemide   Injectable 40 milliGRAM(s) IV Push once  heparin  Injectable 5000 Unit(s) SubCutaneous every 8 hours  insulin glargine Injectable (LANTUS) 10 Unit(s) SubCutaneous at bedtime  insulin lispro (HumaLOG) corrective regimen sliding scale   SubCutaneous Before meals and at bedtime  insulin lispro Injectable (HumaLOG) 3 Unit(s) SubCutaneous three times a day before meals  magnesium oxide 400 milliGRAM(s) Oral once  metoprolol tartrate 25 milliGRAM(s) Oral every 6 hours  pantoprazole    Tablet 40 milliGRAM(s) Oral before breakfast  sodium chloride 0.9% lock flush 3 milliLiter(s) IV Push every 8 hours  sodium chloride 0.9%. 1000 milliLiter(s) (10 mL/Hr) IV Continuous <Continuous>    MEDICATIONS  (PRN):  acetaminophen   Tablet .. 650 milliGRAM(s) Oral every 6 hours PRN Mild Pain (1 - 3)  oxyCODONE    5 mG/acetaminophen 325 mG 1 Tablet(s) Oral every 4 hours PRN Moderate Pain (4 - 6)  oxyCODONE    5 mG/acetaminophen 325 mG 2 Tablet(s) Oral every 4 hours PRN Severe Pain (7 - 10)      Vital Signs Last 24 Hrs  T(C): 38.1 (04 Nov 2018 05:12), Max: 38.4 (04 Nov 2018 05:07)  T(F): 100.6 (04 Nov 2018 05:12), Max: 101.2 (04 Nov 2018 05:07)  HR: 78 (04 Nov 2018 05:12) (76 - 88)  BP: 167/69 (04 Nov 2018 05:12) (140/53 - 167/69)  BP(mean): 105 (04 Nov 2018 05:12) (84 - 121)  RR: 14 (04 Nov 2018 05:12) (14 - 16)  SpO2: 92% (04 Nov 2018 05:12) (88% - 94%)    PHYSICAL EXAM:      Constitutional: A&Ox3    Neuro: left slightly weaker than right in strength, but grossly intact, CNs grossly intact    Respiratory: non labored breathing, no respiratory distress    Cardiovascular: NSR, RRR    Gastrointestinal: soft, nontender, nondistended    Extremities: (-) edema                  I&O's Detail    03 Nov 2018 08:01  -  04 Nov 2018 07:00  --------------------------------------------------------  IN:  Total IN: 0 mL    OUT:    Indwelling Catheter - Urethral: 40 mL    Voided: 100 mL    Voided: 1 mL  Total OUT: 141 mL    Total NET: -141 mL          LABS:                        9.7    9.8   )-----------( 134      ( 04 Nov 2018 05:42 )             29.5     11-04    133<L>  |  96  |  23  ----------------------------<  138<H>  4.1   |  23  |  1.22    Ca    9.2      04 Nov 2018 05:42  Phos  3.0     11-03  Mg     1.8     11-04    TPro  6.0  /  Alb  4.1  /  TBili  0.4  /  DBili  <0.2  /  AST  43<H>  /  ALT  8<L>  /  AlkPhos  38<L>  11-02    PT/INR - ( 03 Nov 2018 10:23 )   PT: 13.6 sec;   INR: 1.20          PTT - ( 03 Nov 2018 10:23 )  PTT:33.7 sec      RADIOLOGY & ADDITIONAL STUDIES:    67yo patient of Dr. Dewey with known L carotid disease, with a PMH of CAD, HTN, HLD, DM, CKDIII who presented to [facility] with chest pain, and is now s/p CABG on 11/2.    - Minimal L hand weakness, otherwise sensation/motor grossly intact, no changes noted  - management as per primary team  - cleared by vascular surgery, follow up with Dr. Villaseñor when follow up with Dr. Burnett, phone number is [phone]

## 2018-11-04 NOTE — PROGRESS NOTE ADULT - ASSESSMENT
A/P: 69 y/o F with  PMHx of CAD, HTN, HLD, DM II, and CKD stage 3 s/p CABGx3 on 11/1/18    Neurovascular: No delirium, pain well managed on current regimen, cirtical L ICA stenosis  -permissive HTN, SBP>130  -C/w PRNs for Pain control  -Monitor neuro status    Respiratory: Saturates low 90's on room air     -AM CXR stable, repeat in AM  -Encourage IS 10x/hour while awake, Cough and deep breathing exercises  -Monitor respiratory status via SpO2    Cardiovascular: s/p CABG EF 55%  -beta blocker titrated up to 25 q 6  -c/w aspirin 81 and plavix 75  -c/w lipitor 80  -Monitor HR/BP/Tele    GI: Tolerating PO  -Prophylaxis: Protonix  -C/w bowel regimen    /Renal: CRI   -BUN/Cr: 23/1.22  -Trend Cr on AM labs  -Replete electrolytes as needed    ID: Afebrile, asymptomatic  -WCC: 9.8  -Continue to monitor for SIRS/Sepsis syndrome while inpatient    Endo: DMII, Edocrinology recs appreciated  -A1C:8.3  -TSH: 1.86  -c/w lanuts 10, prandial lispro 3, and lispro SS    Heme:   -H/H:9.7/29.5  -CBC, chem in AM  -DVT ppx: HSQ 5000 u q8h and SCDs    Disposition: Home when medically appropriate.

## 2018-11-04 NOTE — PROGRESS NOTE ADULT - SUBJECTIVE AND OBJECTIVE BOX
Patient discussed on morning rounds with Dr. Richmond      Operation / Date: 11/1/18 CABGx3 EF 55%    SUBJECTIVE ASSESSMENT:    Pt reports percocet makes her very drowsy/lethargic, and has been using tylenol but getting no pain relief, limiting her inspiratory effort. She denies dyspnea/SOB.  She is tolerating PO, denies N/V, fevers/chills.  She is ambulating with assistance and breathing comfortably on room air.      Vital Signs Last 24 Hrs  T(C): 36.8 (04 Nov 2018 10:00), Max: 38.4 (04 Nov 2018 05:07)  T(F): 98.2 (04 Nov 2018 10:00), Max: 101.2 (04 Nov 2018 05:07)  HR: 77 (04 Nov 2018 08:00) (77 - 88)  BP: 162/78 (04 Nov 2018 08:00) (155/64 - 167/69)  BP(mean): 121 (04 Nov 2018 08:00) (100 - 121)  RR: 14 (04 Nov 2018 08:00) (14 - 16)  SpO2: 95% (04 Nov 2018 08:00) (92% - 95%)  I&O's Detail    03 Nov 2018 08:01  -  04 Nov 2018 07:00  --------------------------------------------------------  IN:  Total IN: 0 mL    OUT:    Indwelling Catheter - Urethral: 40 mL    Voided: 100 mL    Voided: 1 mL  Total OUT: 141 mL    Total NET: -141 mL    CHEST TUBE:  No.   LUIZ DRAIN:  No.  EPICARDIAL WIRES: Yes.  TIE DOWNS: Yes.  HUERTA: No.    PHYSICAL EXAM:    General: NAD    Neurological: A&OX3    Cardiovascular: S1S2 RRR    Respiratory: decreased BS at bilateral bases    Gastrointestinal: soft NT/ND +BS    Extremities: trace edema    Vascular: warm and well perfused bilaterally    Incision Sites: MSI C/D/I, EVH C/D/I    LABS:                        9.7    9.8   )-----------( 134      ( 04 Nov 2018 05:42 )             29.5       COUMADIN:  No. REASON: .    PT/INR - ( 03 Nov 2018 10:23 )   PT: 13.6 sec;   INR: 1.20          PTT - ( 03 Nov 2018 10:23 )  PTT:33.7 sec    11-04    133<L>  |  96  |  23  ----------------------------<  138<H>  4.1   |  23  |  1.22    Ca    9.2      04 Nov 2018 05:42  Phos  3.0     11-03  Mg     1.8     11-04    TPro  6.0  /  Alb  4.1  /  TBili  0.4  /  DBili  <0.2  /  AST  43<H>  /  ALT  8<L>  /  AlkPhos  38<L>  11-02          MEDICATIONS  (STANDING):  aspirin enteric coated 81 milliGRAM(s) Oral daily  atorvastatin 80 milliGRAM(s) Oral at bedtime  chlorhexidine 0.12% Liquid 5 milliLiter(s) Oral Mucosa every 4 hours  chlorhexidine 2% Cloths 1 Application(s) Topical daily  clopidogrel Tablet 75 milliGRAM(s) Oral daily  dextrose 5%. 1000 milliLiter(s) (50 mL/Hr) IV Continuous <Continuous>  docusate sodium 100 milliGRAM(s) Oral three times a day  famotidine    Tablet 20 milliGRAM(s) Oral daily  furosemide    Tablet 20 milliGRAM(s) Oral daily  heparin  Injectable 5000 Unit(s) SubCutaneous every 8 hours  insulin glargine Injectable (LANTUS) 10 Unit(s) SubCutaneous at bedtime  insulin lispro (HumaLOG) corrective regimen sliding scale   SubCutaneous Before meals and at bedtime  insulin lispro Injectable (HumaLOG) 3 Unit(s) SubCutaneous three times a day before meals  lidocaine   Patch 1 Patch Transdermal daily  metoprolol tartrate 25 milliGRAM(s) Oral every 6 hours  pantoprazole    Tablet 40 milliGRAM(s) Oral before breakfast  potassium chloride    Tablet ER 10 milliEquivalent(s) Oral daily  sodium chloride 0.9% lock flush 3 milliLiter(s) IV Push every 8 hours  sodium chloride 0.9%. 1000 milliLiter(s) (10 mL/Hr) IV Continuous <Continuous>    MEDICATIONS  (PRN):  acetaminophen   Tablet .. 650 milliGRAM(s) Oral every 6 hours PRN Mild Pain (1 - 3)  oxyCODONE    5 mG/acetaminophen 325 mG 1 Tablet(s) Oral every 4 hours PRN Moderate Pain (4 - 6)  traMADol 25 milliGRAM(s) Oral every 6 hours PRN Severe Pain (7 - 10)        RADIOLOGY & ADDITIONAL TESTS:  CXR; bibasilar atelectasis/effusion, but poor quality film

## 2018-11-04 NOTE — PROGRESS NOTE ADULT - SUBJECTIVE AND OBJECTIVE BOX
INTERVAL HPI/OVERNIGHT EVENTS:    Patient is a 68y old  Female who presents with a chief complaint of chest pain   Pt s/p CABG  feels better today  ate breakfast and lunch  no BM today, no nausea, no vomiting, improved SOB  she is out of bed.         Pt reports the following symptoms:    CONSTITUTIONAL:  Negative fever or chills, feels well, good appetite  EYES:  Negative  blurry vision or double vision  CARDIOVASCULAR:  Negative for chest pain or palpitations  RESPIRATORY:  Negative for cough, wheezing, or SOB   GASTROINTESTINAL:  Negative for nausea, vomiting, diarrhea, constipation, or abdominal pain  GENITOURINARY:  Negative frequency, urgency or dysuria  NEUROLOGIC:  No headache, confusion, dizziness, lightheadedness    MEDICATIONS  (STANDING):  aspirin enteric coated 81 milliGRAM(s) Oral daily  atorvastatin 80 milliGRAM(s) Oral at bedtime  chlorhexidine 0.12% Liquid 5 milliLiter(s) Oral Mucosa every 4 hours  chlorhexidine 2% Cloths 1 Application(s) Topical daily  clopidogrel Tablet 75 milliGRAM(s) Oral daily  dextrose 5%. 1000 milliLiter(s) (50 mL/Hr) IV Continuous <Continuous>  docusate sodium 100 milliGRAM(s) Oral three times a day  famotidine    Tablet 20 milliGRAM(s) Oral daily  furosemide    Tablet 20 milliGRAM(s) Oral daily  heparin  Injectable 5000 Unit(s) SubCutaneous every 8 hours  insulin glargine Injectable (LANTUS) 10 Unit(s) SubCutaneous at bedtime  insulin lispro (HumaLOG) corrective regimen sliding scale   SubCutaneous Before meals and at bedtime  insulin lispro Injectable (HumaLOG) 3 Unit(s) SubCutaneous three times a day before meals  lidocaine   Patch 1 Patch Transdermal daily  metoprolol tartrate 25 milliGRAM(s) Oral every 6 hours  pantoprazole    Tablet 40 milliGRAM(s) Oral before breakfast  potassium chloride    Tablet ER 10 milliEquivalent(s) Oral daily  sodium chloride 0.9% lock flush 3 milliLiter(s) IV Push every 8 hours  sodium chloride 0.9%. 1000 milliLiter(s) (10 mL/Hr) IV Continuous <Continuous>    MEDICATIONS  (PRN):  acetaminophen   Tablet .. 650 milliGRAM(s) Oral every 6 hours PRN Mild Pain (1 - 3)  oxyCODONE    5 mG/acetaminophen 325 mG 1 Tablet(s) Oral every 4 hours PRN Moderate Pain (4 - 6)  traMADol 25 milliGRAM(s) Oral every 6 hours PRN Severe Pain (7 - 10)      PHYSICAL EXAM  Vital Signs Last 24 Hrs  T(C): 36.8 (2018 10:00), Max: 38.4 (2018 05:07)  T(F): 98.2 (2018 10:00), Max: 101.2 (2018 05:07)  HR: 82 (2018 12:33) (77 - 88)  BP: 159/74 (2018 12:33) (155/64 - 167/69)  BP(mean): 94 (2018 12:33) (94 - 121)  RR: 16 (2018 12:33) (14 - 16)  SpO2: 94% (2018 12:33) (92% - 95%)    Constitutional: wn/wd in NAD.   HEENT: NCAT, MMM, OP clear, EOMI, no proptosis or lid retraction  Neck: no thyromegaly or palpable thyroid nodules   Respiratory: lungs CTAB.  Cardiovascular: regular rhythm, normal S1 and S2, no audible murmurs, 1+ lower extremity edema  GI: soft, NT/ND, no masses/HSM appreciated.  Neurology: no tremors, DTR 2+  Skin: no visible rashes/lesions  Psychiatric: AAO x 3, normal affect/mood.    LABS:                        9.7    9.8   )-----------( 134      ( 2018 05:42 )             29.5     11-04    133<L>  |  96  |  23  ----------------------------<  138<H>  4.1   |  23  |  1.22    Ca    9.2      2018 05:42  Phos  3.0     11-03  Mg     1.8     11-04      PT/INR - ( 2018 10:23 )   PT: 13.6 sec;   INR: 1.20          PTT - ( 2018 10:23 )  PTT:33.7 sec  Urinalysis Basic - ( 2018 11:41 )    Color: Yellow / Appearance: Clear / S.010 / pH: x  Gluc: x / Ketone: NEGATIVE  / Bili: Negative / Urobili: 0.2 E.U./dL   Blood: x / Protein: 30 mg/dL / Nitrite: NEGATIVE   Leuk Esterase: NEGATIVE / RBC: 5-10 /HPF / WBC < 5 /HPF   Sq Epi: x / Non Sq Epi: 0-5 /HPF / Bacteria: Present /HPF      Thyroid Stimulating Hormone, Serum: 1.866 uIU/mL (10-30 @ 05:51)      HbA1C: 8.3 % (10-30 @ 05:51)    CAPILLARY BLOOD GLUCOSE      POCT Blood Glucose.: 133 mg/dL (2018 12:27)  POCT Blood Glucose.: 142 mg/dL (2018 07:01)  POCT Blood Glucose.: 214 mg/dL (2018 21:29)  POCT Blood Glucose.: 198 mg/dL (2018 16:49)      Insulin Sliding Scale requirements X 24 Hours:

## 2018-11-05 ENCOUNTER — TRANSCRIPTION ENCOUNTER (OUTPATIENT)
Age: 68
End: 2018-11-05

## 2018-11-05 VITALS
RESPIRATION RATE: 18 BRPM | SYSTOLIC BLOOD PRESSURE: 114 MMHG | HEART RATE: 70 BPM | DIASTOLIC BLOOD PRESSURE: 60 MMHG | OXYGEN SATURATION: 96 %

## 2018-11-05 LAB
ANION GAP SERPL CALC-SCNC: 14 MMOL/L — SIGNIFICANT CHANGE UP (ref 5–17)
BUN SERPL-MCNC: 30 MG/DL — HIGH (ref 7–23)
CALCIUM SERPL-MCNC: 9 MG/DL — SIGNIFICANT CHANGE UP (ref 8.4–10.5)
CHLORIDE SERPL-SCNC: 97 MMOL/L — SIGNIFICANT CHANGE UP (ref 96–108)
CO2 SERPL-SCNC: 25 MMOL/L — SIGNIFICANT CHANGE UP (ref 22–31)
CREAT SERPL-MCNC: 1.31 MG/DL — HIGH (ref 0.5–1.3)
GLUCOSE BLDC GLUCOMTR-MCNC: 143 MG/DL — HIGH (ref 70–99)
GLUCOSE BLDC GLUCOMTR-MCNC: 235 MG/DL — HIGH (ref 70–99)
GLUCOSE BLDC GLUCOMTR-MCNC: 245 MG/DL — HIGH (ref 70–99)
GLUCOSE SERPL-MCNC: 114 MG/DL — HIGH (ref 70–99)
HCT VFR BLD CALC: 29.6 % — LOW (ref 34.5–45)
HGB BLD-MCNC: 9.6 G/DL — LOW (ref 11.5–15.5)
MAGNESIUM SERPL-MCNC: 1.7 MG/DL — SIGNIFICANT CHANGE UP (ref 1.6–2.6)
MCHC RBC-ENTMCNC: 27.4 PG — SIGNIFICANT CHANGE UP (ref 27–34)
MCHC RBC-ENTMCNC: 32.4 G/DL — SIGNIFICANT CHANGE UP (ref 32–36)
MCV RBC AUTO: 84.6 FL — SIGNIFICANT CHANGE UP (ref 80–100)
PLATELET # BLD AUTO: 168 K/UL — SIGNIFICANT CHANGE UP (ref 150–400)
POTASSIUM SERPL-MCNC: 3.9 MMOL/L — SIGNIFICANT CHANGE UP (ref 3.5–5.3)
POTASSIUM SERPL-SCNC: 3.9 MMOL/L — SIGNIFICANT CHANGE UP (ref 3.5–5.3)
RBC # BLD: 3.5 M/UL — LOW (ref 3.8–5.2)
RBC # FLD: 13.5 % — SIGNIFICANT CHANGE UP (ref 10.3–16.9)
SODIUM SERPL-SCNC: 136 MMOL/L — SIGNIFICANT CHANGE UP (ref 135–145)
WBC # BLD: 9 K/UL — SIGNIFICANT CHANGE UP (ref 3.8–10.5)
WBC # FLD AUTO: 9 K/UL — SIGNIFICANT CHANGE UP (ref 3.8–10.5)

## 2018-11-05 PROCEDURE — 71045 X-RAY EXAM CHEST 1 VIEW: CPT | Mod: 26

## 2018-11-05 RX ORDER — ATORVASTATIN CALCIUM 80 MG/1
1 TABLET, FILM COATED ORAL
Qty: 7 | Refills: 0 | OUTPATIENT
Start: 2018-11-05 | End: 2018-11-11

## 2018-11-05 RX ORDER — ATORVASTATIN CALCIUM 80 MG/1
1 TABLET, FILM COATED ORAL
Qty: 0 | Refills: 0 | COMMUNITY

## 2018-11-05 RX ORDER — SITAGLIPTIN 50 MG/1
1 TABLET, FILM COATED ORAL
Qty: 30 | Refills: 0 | OUTPATIENT
Start: 2018-11-05 | End: 2018-12-04

## 2018-11-05 RX ORDER — METFORMIN HYDROCHLORIDE 850 MG/1
1 TABLET ORAL
Qty: 7 | Refills: 0 | OUTPATIENT
Start: 2018-11-05 | End: 2018-11-11

## 2018-11-05 RX ORDER — DOCUSATE SODIUM 100 MG
1 CAPSULE ORAL
Qty: 21 | Refills: 0 | OUTPATIENT
Start: 2018-11-05 | End: 2018-11-11

## 2018-11-05 RX ORDER — METFORMIN HYDROCHLORIDE 850 MG/1
1 TABLET ORAL
Qty: 60 | Refills: 0 | OUTPATIENT
Start: 2018-11-05 | End: 2018-12-04

## 2018-11-05 RX ORDER — ASPIRIN/CALCIUM CARB/MAGNESIUM 324 MG
1 TABLET ORAL
Qty: 30 | Refills: 0 | OUTPATIENT
Start: 2018-11-05 | End: 2018-12-04

## 2018-11-05 RX ORDER — CLOPIDOGREL BISULFATE 75 MG/1
1 TABLET, FILM COATED ORAL
Qty: 30 | Refills: 0 | OUTPATIENT
Start: 2018-11-05 | End: 2018-12-04

## 2018-11-05 RX ORDER — CLOPIDOGREL BISULFATE 75 MG/1
1 TABLET, FILM COATED ORAL
Qty: 7 | Refills: 0 | OUTPATIENT
Start: 2018-11-05 | End: 2018-11-11

## 2018-11-05 RX ORDER — PANTOPRAZOLE SODIUM 20 MG/1
1 TABLET, DELAYED RELEASE ORAL
Qty: 7 | Refills: 0 | OUTPATIENT
Start: 2018-11-05 | End: 2018-11-11

## 2018-11-05 RX ORDER — EMPAGLIFLOZIN 10 MG/1
1 TABLET, FILM COATED ORAL
Qty: 30 | Refills: 0 | OUTPATIENT
Start: 2018-11-05 | End: 2018-12-04

## 2018-11-05 RX ORDER — METOPROLOL TARTRATE 50 MG
1 TABLET ORAL
Qty: 60 | Refills: 0 | OUTPATIENT
Start: 2018-11-05 | End: 2018-12-04

## 2018-11-05 RX ORDER — PANTOPRAZOLE SODIUM 20 MG/1
1 TABLET, DELAYED RELEASE ORAL
Qty: 30 | Refills: 0 | OUTPATIENT
Start: 2018-11-05 | End: 2018-12-04

## 2018-11-05 RX ORDER — TRAMADOL HYDROCHLORIDE 50 MG/1
0.5 TABLET ORAL
Qty: 14 | Refills: 0 | OUTPATIENT
Start: 2018-11-05 | End: 2018-11-11

## 2018-11-05 RX ORDER — SITAGLIPTIN 50 MG/1
1 TABLET, FILM COATED ORAL
Qty: 7 | Refills: 0 | OUTPATIENT
Start: 2018-11-05 | End: 2018-11-11

## 2018-11-05 RX ORDER — ATORVASTATIN CALCIUM 80 MG/1
1 TABLET, FILM COATED ORAL
Qty: 30 | Refills: 0 | OUTPATIENT
Start: 2018-11-05 | End: 2018-12-04

## 2018-11-05 RX ORDER — METOPROLOL TARTRATE 50 MG
1 TABLET ORAL
Qty: 14 | Refills: 0 | OUTPATIENT
Start: 2018-11-05 | End: 2018-11-11

## 2018-11-05 RX ORDER — POTASSIUM CHLORIDE 20 MEQ
20 PACKET (EA) ORAL ONCE
Qty: 0 | Refills: 0 | Status: COMPLETED | OUTPATIENT
Start: 2018-11-05 | End: 2018-11-05

## 2018-11-05 RX ORDER — METFORMIN HYDROCHLORIDE 850 MG/1
1 TABLET ORAL
Qty: 14 | Refills: 0 | OUTPATIENT
Start: 2018-11-05 | End: 2018-11-11

## 2018-11-05 RX ORDER — METFORMIN HYDROCHLORIDE 850 MG/1
1 TABLET ORAL
Qty: 0 | Refills: 0 | COMMUNITY

## 2018-11-05 RX ORDER — ASPIRIN/CALCIUM CARB/MAGNESIUM 324 MG
1 TABLET ORAL
Qty: 0 | Refills: 0 | COMMUNITY

## 2018-11-05 RX ORDER — METOPROLOL TARTRATE 50 MG
1 TABLET ORAL
Qty: 0 | Refills: 0 | COMMUNITY

## 2018-11-05 RX ORDER — ASPIRIN/CALCIUM CARB/MAGNESIUM 324 MG
1 TABLET ORAL
Qty: 0 | Refills: 0 | COMMUNITY
Start: 2018-11-05

## 2018-11-05 RX ORDER — GLIMEPIRIDE 1 MG
1 TABLET ORAL
Qty: 14 | Refills: 0 | OUTPATIENT
Start: 2018-11-05 | End: 2018-11-11

## 2018-11-05 RX ORDER — EMPAGLIFLOZIN 10 MG/1
1 TABLET, FILM COATED ORAL
Qty: 7 | Refills: 0 | OUTPATIENT
Start: 2018-11-05 | End: 2018-11-11

## 2018-11-05 RX ORDER — MAGNESIUM OXIDE 400 MG ORAL TABLET 241.3 MG
800 TABLET ORAL ONCE
Qty: 0 | Refills: 0 | Status: COMPLETED | OUTPATIENT
Start: 2018-11-05 | End: 2018-11-05

## 2018-11-05 RX ORDER — GLIMEPIRIDE 1 MG
1 TABLET ORAL
Qty: 60 | Refills: 0 | OUTPATIENT
Start: 2018-11-05 | End: 2018-12-04

## 2018-11-05 RX ORDER — ACETAMINOPHEN 500 MG
2 TABLET ORAL
Qty: 0 | Refills: 0 | COMMUNITY
Start: 2018-11-05

## 2018-11-05 RX ADMIN — Medication 81 MILLIGRAM(S): at 11:43

## 2018-11-05 RX ADMIN — Medication 100 MILLIGRAM(S): at 14:01

## 2018-11-05 RX ADMIN — MAGNESIUM OXIDE 400 MG ORAL TABLET 800 MILLIGRAM(S): 241.3 TABLET ORAL at 09:45

## 2018-11-05 RX ADMIN — SODIUM CHLORIDE 3 MILLILITER(S): 9 INJECTION INTRAMUSCULAR; INTRAVENOUS; SUBCUTANEOUS at 05:46

## 2018-11-05 RX ADMIN — CLOPIDOGREL BISULFATE 75 MILLIGRAM(S): 75 TABLET, FILM COATED ORAL at 11:42

## 2018-11-05 RX ADMIN — Medication 650 MILLIGRAM(S): at 11:45

## 2018-11-05 RX ADMIN — Medication 37.5 MILLIGRAM(S): at 11:43

## 2018-11-05 RX ADMIN — Medication 37.5 MILLIGRAM(S): at 17:19

## 2018-11-05 RX ADMIN — Medication 37.5 MILLIGRAM(S): at 06:58

## 2018-11-05 RX ADMIN — CHLORHEXIDINE GLUCONATE 1 APPLICATION(S): 213 SOLUTION TOPICAL at 12:53

## 2018-11-05 RX ADMIN — Medication 37.5 MILLIGRAM(S): at 00:00

## 2018-11-05 RX ADMIN — Medication 4: at 17:19

## 2018-11-05 RX ADMIN — HEPARIN SODIUM 5000 UNIT(S): 5000 INJECTION INTRAVENOUS; SUBCUTANEOUS at 07:00

## 2018-11-05 RX ADMIN — Medication 3 UNIT(S): at 17:19

## 2018-11-05 RX ADMIN — Medication 3 UNIT(S): at 08:27

## 2018-11-05 RX ADMIN — CHLORHEXIDINE GLUCONATE 5 MILLILITER(S): 213 SOLUTION TOPICAL at 09:45

## 2018-11-05 RX ADMIN — HEPARIN SODIUM 5000 UNIT(S): 5000 INJECTION INTRAVENOUS; SUBCUTANEOUS at 14:00

## 2018-11-05 RX ADMIN — Medication 100 MILLIGRAM(S): at 06:57

## 2018-11-05 RX ADMIN — LIDOCAINE 1 PATCH: 4 CREAM TOPICAL at 11:45

## 2018-11-05 RX ADMIN — Medication 20 MILLIEQUIVALENT(S): at 09:45

## 2018-11-05 RX ADMIN — Medication 3 UNIT(S): at 12:25

## 2018-11-05 RX ADMIN — Medication 10 MILLIEQUIVALENT(S): at 11:44

## 2018-11-05 RX ADMIN — FAMOTIDINE 20 MILLIGRAM(S): 10 INJECTION INTRAVENOUS at 11:48

## 2018-11-05 RX ADMIN — CHLORHEXIDINE GLUCONATE 5 MILLILITER(S): 213 SOLUTION TOPICAL at 14:01

## 2018-11-05 RX ADMIN — Medication 650 MILLIGRAM(S): at 13:29

## 2018-11-05 RX ADMIN — PANTOPRAZOLE SODIUM 40 MILLIGRAM(S): 20 TABLET, DELAYED RELEASE ORAL at 06:59

## 2018-11-05 RX ADMIN — Medication 20 MILLIGRAM(S): at 07:00

## 2018-11-05 RX ADMIN — SODIUM CHLORIDE 3 MILLILITER(S): 9 INJECTION INTRAMUSCULAR; INTRAVENOUS; SUBCUTANEOUS at 14:03

## 2018-11-05 RX ADMIN — Medication 4: at 12:24

## 2018-11-05 NOTE — PROGRESS NOTE ADULT - SUBJECTIVE AND OBJECTIVE BOX
Patient discussed on morning rounds with Dr. Burnett    Operation / Date: 18 CABGx3 EF 55%    Surgeon: Dr. Burnett    Referring Physician: Dr. Berumen    SUBJECTIVE ASSESSMENT   68y Female seen and examined bedside. Patient is not offering complaints. Patient is ambulating in the halls on room air, tolerating PO diet and voiding spontaneously. Denies chest pain, SOB, palpitations, hemopytsis, N/V/D, abdominal pain, dizziness, fever or chills.     AND HOSPITAL COURSE:  68 year old female (visiting from [facility]) with FHx of CAD and PMHx of MI medically managed 7 months ago, HTN, HLD, DM, CKD stage 3 (baseline Cr unknown), presented to [facility] ED 10/29/18 complaining of 8/10 stabbing chest pain that radiated to her left shoulder x 1 day, associated with nausea and SOB. Patient states that since her MI 7 months ago she has had intermittent chest pain at rest and on exertion. In the ED, CTA coronaries revealed moderately elevated calcium score with severe stenosis due to calcific and noncalcific plaque pLAD, D1, mLCx, mRCA and dRCA. She was admitted to cardiology for further workup and underwent cardiac cath revealing 3VCAD: 50% oLM lesion, 90-95% pLAD lesion, 90% D1 lesion, 80% pLCx lesion, 90% OM1 lesion, 100% mRCA lesion with L-R collaterals, EF:55%. Cardiothoracic surgery, Dr. Burnett, consulted for surgical evaluation and patient deemed a surgical candidate. On 2018 patient underwent Coronary Artery Bypass Grafting (LIMA-LAD, SVG-PDA, OM),  EF 55%, OR uncomplicated and patient transferred to CTICU. POD0 extubated, POD1 weaned off Librado, ambulated, and transferred to Mini-ICU on [facility] stepdown unit. Of note, upon preop workup patient found to have 70% or greater diameter stenosis of the left internal carotid artery, critical stenosis, and no hemodynamically significant right carotid stenosis. Vascular surgery consulted, and patient will follow up as outpatient. While inpatient, permissive HTN for carotid stenosis, SBP >130s. Endocrine also consulted for A1C 8.3, recs appreciated. Today is POD 4 and patient is tolerating PO diet, moving her bowels and voiding spontaneously. Of note, CXR showed possible b/l pleural effusions, ultrasound chest confirmed less than 200cc on the left and less than 250cc on the right, this was discussed with Dr. Hogan and Dr. Burnett, the patient is net negative, and ambulating multiple times in the halls on room air, patient denies SOB.  Patient is hemodynamically stable and heart rate is controlled and per Dr. Burnett, patient is medically stable for discharge today with outpatient follow ups. Of note, a 7 day supply of prescriptions has been given to the patient prior to discharge and a 30 day supply has been sent to their local pharmacy. Patient understands and agrees with this plan.       Vital Signs Last 24 Hrs  T(C): 36.5 (2018 17:12), Max: 37.4 (2018 21:54)  T(F): 97.7 (2018 17:12), Max: 99.3 (2018 21:54)  HR: 70 (2018 17:28) (66 - 74)  BP: 114/60 (2018 17:28) (114/55 - 162/75)  BP(mean): 76 (2018 17:28) (76 - 130)  RR: 18 (2018 17:28) (16 - 18)  SpO2: 96% (2018 17:28) (92% - 96%)    EPICARDIAL WIRES REMOVED: Yes  TIE DOWNS REMOVED: Yes.    PHYSICAL EXAM:    General: Patient lying comfortably in bed, no acute distress     Neurological: Alert and oriented. No focal neurological deficits     Cardiovascular: S1S2, RRR, no murmurs appreciated on exam     Respiratory: Diminished at the left base, otherwise clear to auscultation b/l, no wheezes rales or rhonchi.     Gastrointestinal: Abdomen soft, non tender, non distended     Extremities: Warm and well perfused. No peripheral edema or calf tenderness     Vascular: Peripheral pulses palpable bilaterally.     Incision Sites: MSI: c/d/i, no signs of infection, no sternal click. R EVH site: c/d/i.    LABS:                        9.6    9.0   )-----------( 168      ( 2018 05:29 )             29.6       COUMADIN:  No.              11-05    136  |  97  |  30<H>  ----------------------------<  114<H>  3.9   |  25  |  1.31<H>    Ca    9.0      2018 05:29  Mg     1.7             Urinalysis Basic - ( 2018 11:41 )    Color: Yellow / Appearance: Clear / S.010 / pH: x  Gluc: x / Ketone: NEGATIVE  / Bili: Negative / Urobili: 0.2 E.U./dL   Blood: x / Protein: 30 mg/dL / Nitrite: NEGATIVE   Leuk Esterase: NEGATIVE / RBC: 5-10 /HPF / WBC < 5 /HPF   Sq Epi: x / Non Sq Epi: 0-5 /HPF / Bacteria: Present /HPF        Discharge CXR: < from: Xray Chest 1 View- PORTABLE-Routine (18 @ 06:52) >    EXAM:  XR CHEST PORTABLE ROUTINE 1V                          PROCEDURE DATE:  2018          INTERPRETATION:  HISTORY: Chest pain, status post CABG    Vertebral AP view of the chest is compared to prior radiograph of   2018.    IMPRESSION: There has been no gross change in bibasilar the pleural   effusions, allowing for interval differences in positioning between   examinations. There is newly evident probable discoid atelectatic density   in the left upper parahilar region. There hasotherwise been no   significant interval change.    < end of copied text >      Discharge ECHO: not needed Patient discussed on morning rounds with Dr. Burnett    Operation / Date: 18 CABGx3 EF 55%    Surgeon: Dr. Burnett    Referring Physician: Dr. Berumen    SUBJECTIVE ASSESSMENT   68y Female seen and examined bedside. Patient is not offering complaints. Patient is ambulating in the halls on room air, tolerating PO diet and voiding spontaneously. Denies chest pain, SOB, palpitations, hemopytsis, N/V/D, abdominal pain, dizziness, fever or chills.     Of note, prior to discharge, Endocrine Dr. Mortensen was called about discharge recommendation due to insurance issues. Patient was discharged on Glimeperide 2mg BID, per Dr. Mortensen.     AND HOSPITAL COURSE:  68 year old female (visiting from [facility]) with FHx of CAD and PMHx of MI medically managed 7 months ago, HTN, HLD, DM, CKD stage 3 (baseline Cr unknown), presented to [facility] ED 10/29/18 complaining of 8/10 stabbing chest pain that radiated to her left shoulder x 1 day, associated with nausea and SOB. Patient states that since her MI 7 months ago she has had intermittent chest pain at rest and on exertion. In the ED, CTA coronaries revealed moderately elevated calcium score with severe stenosis due to calcific and noncalcific plaque pLAD, D1, mLCx, mRCA and dRCA. She was admitted to cardiology for further workup and underwent cardiac cath revealing 3VCAD: 50% oLM lesion, 90-95% pLAD lesion, 90% D1 lesion, 80% pLCx lesion, 90% OM1 lesion, 100% mRCA lesion with L-R collaterals, EF:55%. Cardiothoracic surgery, Dr. Burnett, consulted for surgical evaluation and patient deemed a surgical candidate. On 2018 patient underwent Coronary Artery Bypass Grafting (LIMA-LAD, SVG-PDA, OM),  EF 55%, OR uncomplicated and patient transferred to CTICU. POD0 extubated, POD1 weaned off Librado, ambulated, and transferred to Mini-ICU on [facility] stepdown unit. Of note, upon preop workup patient found to have 70% or greater diameter stenosis of the left internal carotid artery, critical stenosis, and no hemodynamically significant right carotid stenosis. Vascular surgery consulted, and patient will follow up as outpatient. While inpatient, permissive HTN for carotid stenosis, SBP >130s. Endocrine also consulted for A1C 8.3, recs appreciated. Today is POD 4 and patient is tolerating PO diet, moving her bowels and voiding spontaneously. Of note, CXR showed possible b/l pleural effusions, ultrasound chest confirmed less than 200cc on the left and less than 250cc on the right, this was discussed with Dr. Hogan and Dr. Burnett, the patient is net negative, and ambulating multiple times in the halls on room air, patient denies SOB.  Patient is hemodynamically stable and heart rate is controlled and per Dr. Burnett, patient is medically stable for discharge today with outpatient follow ups. Of note, a 7 day supply of prescriptions has been given to the patient prior to discharge and a 30 day supply has been sent to their local pharmacy. Patient understands and agrees with this plan.       Vital Signs Last 24 Hrs  T(C): 36.5 (2018 17:12), Max: 37.4 (2018 21:54)  T(F): 97.7 (2018 17:12), Max: 99.3 (2018 21:54)  HR: 70 (2018 17:28) (66 - 74)  BP: 114/60 (2018 17:28) (114/55 - 162/75)  BP(mean): 76 (2018 17:28) (76 - 130)  RR: 18 (2018 17:28) (16 - 18)  SpO2: 96% (2018 17:28) (92% - 96%)    EPICARDIAL WIRES REMOVED: Yes  TIE DOWNS REMOVED: Yes.    PHYSICAL EXAM:    General: Patient lying comfortably in bed, no acute distress     Neurological: Alert and oriented. No focal neurological deficits     Cardiovascular: S1S2, RRR, no murmurs appreciated on exam     Respiratory: Diminished at the left base, otherwise clear to auscultation b/l, no wheezes rales or rhonchi.     Gastrointestinal: Abdomen soft, non tender, non distended     Extremities: Warm and well perfused. No peripheral edema or calf tenderness     Vascular: Peripheral pulses palpable bilaterally.     Incision Sites: MSI: c/d/i, no signs of infection, no sternal click. R EVH site: c/d/i.    LABS:                        9.6    9.0   )-----------( 168      ( 2018 05:29 )             29.6       COUMADIN:  No.                  136  |  97  |  30<H>  ----------------------------<  114<H>  3.9   |  25  |  1.31<H>    Ca    9.0      2018 05:29  Mg     1.7     11-05        Urinalysis Basic - ( 2018 11:41 )    Color: Yellow / Appearance: Clear / S.010 / pH: x  Gluc: x / Ketone: NEGATIVE  / Bili: Negative / Urobili: 0.2 E.U./dL   Blood: x / Protein: 30 mg/dL / Nitrite: NEGATIVE   Leuk Esterase: NEGATIVE / RBC: 5-10 /HPF / WBC < 5 /HPF   Sq Epi: x / Non Sq Epi: 0-5 /HPF / Bacteria: Present /HPF        Discharge CXR: < from: Xray Chest 1 View- PORTABLE-Routine (18 @ 06:52) >    EXAM:  XR CHEST PORTABLE ROUTINE 1V                          PROCEDURE DATE:  2018          INTERPRETATION:  HISTORY: Chest pain, status post CABG    Vertebral AP view of the chest is compared to prior radiograph of   2018.    IMPRESSION: There has been no gross change in bibasilar the pleural   effusions, allowing for interval differences in positioning between   examinations. There is newly evident probable discoid atelectatic density   in the left upper parahilar region. There hasotherwise been no   significant interval change.    < end of copied text >      Discharge ECHO: not needed

## 2018-11-05 NOTE — PROGRESS NOTE ADULT - REASON FOR ADMISSION
CABG/cardiovascular surgery
chest pain

## 2018-11-05 NOTE — DISCHARGE NOTE ADULT - HOSPITAL COURSE
68 year old female (visiting from [facility]) with FHx of CAD and PMHx of MI medically managed 7 months ago, HTN, HLD, DM, CKD stage 3 (baseline Cr unknown), presented to [facility] ED 10/29/18 complaining of 8/10 stabbing chest pain that radiated to her left shoulder x 1 day, associated with nausea and SOB. Patient states that since her MI 7 months ago she has had intermittent chest pain at rest and on exertion. In the ED, CTA coronaries revealed moderately elevated calcium score with severe stenosis due to calcific and noncalcific plaque pLAD, D1, mLCx, mRCA and dRCA. She was admitted to cardiology for further workup and underwent cardiac cath today revealing 3VD: 50% oLM lesion, 90-95% pLAD lesion, 90% D1 lesion, 80% pLCx lesion, 90% OM1 lesion, 100% mRCA lesion with L-R collaterals, EF:55%, CT surgery consulted for surgical evaluation. Currently patient is chest pain free and denies any shortness of breath, palpitations, abdominal pain, nasuea, vomiting, recent illness. 68 year old female (visiting from [facility]) with FHx of CAD and PMHx of MI medically managed 7 months ago, HTN, HLD, DM, CKD stage 3 (baseline Cr unknown), presented to [facility] ED 10/29/18 complaining of 8/10 stabbing chest pain that radiated to her left shoulder x 1 day, associated with nausea and SOB. Patient states that since her MI 7 months ago she has had intermittent chest pain at rest and on exertion. In the ED, CTA coronaries revealed moderately elevated calcium score with severe stenosis due to calcific and noncalcific plaque pLAD, D1, mLCx, mRCA and dRCA. She was admitted to cardiology for further workup and underwent cardiac cath revealing 3VCAD: 50% oLM lesion, 90-95% pLAD lesion, 90% D1 lesion, 80% pLCx lesion, 90% OM1 lesion, 100% mRCA lesion with L-R collaterals, EF:55%. Cardiothoracic surgery, Dr. Burnett, consulted for surgical evaluation and patient deemed a surgical candidate. On 11/01/2018 patient underwent Coronary Artery Bypass Grafting (LIMA-LAD, SVG-PDA, OM),  EF 55%, OR uncomplicated and patient transferred to CTICU. POD0 extubated, POD1 weaned off Librado, ambulated, and transferred to Mini-ICU on [facility] stepdown unit. Of note, upon preop workup patient found to have 70% or greater diameter stenosis of the left internal carotid artery, critical stenosis, and no hemodynamically significant right carotid stenosis. Vascular surgery consulted, and patient will follow up as outpatient. While inpatient, permissive HTN for carotid stenosis, SBP >130s. Endocrine also consulted for A1C 8.3, recs appreciated. Today is POD 4 and patient is tolerating PO diet, moving her bowels and voiding spontaneously. Of note, CXR showed possible b/l pleural effusions, ultrasound chest confirmed less than 200cc on the left and less than 250cc on the right, this was discussed with Dr. Hogan and Dr. Burnett, the patient is net negative, and ambulating multiple times in the halls on room air, patient denies SOB.  Patient is hemodynamically stable and heart rate is controlled and per Dr. Burnett, patient is medically stable for discharge today with outpatient follow ups. 68 year old female (visiting from [facility]) with FHx of CAD and PMHx of MI medically managed 7 months ago, HTN, HLD, DM, CKD stage 3 (baseline Cr unknown), presented to [facility] ED 10/29/18 complaining of 8/10 stabbing chest pain that radiated to her left shoulder x 1 day, associated with nausea and SOB. Patient states that since her MI 7 months ago she has had intermittent chest pain at rest and on exertion. In the ED, CTA coronaries revealed moderately elevated calcium score with severe stenosis due to calcific and noncalcific plaque pLAD, D1, mLCx, mRCA and dRCA. She was admitted to cardiology for further workup and underwent cardiac cath revealing 3VCAD: 50% oLM lesion, 90-95% pLAD lesion, 90% D1 lesion, 80% pLCx lesion, 90% OM1 lesion, 100% mRCA lesion with L-R collaterals, EF:55%. Cardiothoracic surgery, Dr. Burnett, consulted for surgical evaluation and patient deemed a surgical candidate. On 11/01/2018 patient underwent Coronary Artery Bypass Grafting (LIMA-LAD, SVG-PDA, OM),  EF 55%, OR uncomplicated and patient transferred to CTICU. POD0 extubated, POD1 weaned off Librado, ambulated, and transferred to Mini-ICU on [facility] stepdown unit. Of note, upon preop workup patient found to have 70% or greater diameter stenosis of the left internal carotid artery, critical stenosis, and no hemodynamically significant right carotid stenosis. Vascular surgery consulted, and patient will follow up as outpatient. While inpatient, permissive HTN for carotid stenosis, SBP >130s. Endocrine also consulted for A1C 8.3, recs appreciated. Today is POD 4 and patient is tolerating PO diet, moving her bowels and voiding spontaneously. Of note, CXR showed possible b/l pleural effusions, ultrasound chest confirmed less than 200cc on the left and less than 250cc on the right, this was discussed with Dr. Hogan and Dr. Burnett, the patient is net negative, and ambulating multiple times in the halls on room air, patient denies SOB.  Patient is hemodynamically stable and heart rate is controlled and per Dr. Burnett, patient is medically stable for discharge today with outpatient follow ups. Of note, a 7 day supply of prescriptions has been given to the patient prior to discharge and a 30 day supply has been sent to [facility]. Patient understands and agrees with this plan. 68 year old female (visiting from [facility]) with FHx of CAD and PMHx of MI medically managed 7 months ago, HTN, HLD, DM, CKD stage 3 (baseline Cr unknown), presented to [facility] ED 10/29/18 complaining of 8/10 stabbing chest pain that radiated to her left shoulder x 1 day, associated with nausea and SOB. Patient states that since her MI 7 months ago she has had intermittent chest pain at rest and on exertion. In the ED, CTA coronaries revealed moderately elevated calcium score with severe stenosis due to calcific and noncalcific plaque pLAD, D1, mLCx, mRCA and dRCA. She was admitted to cardiology for further workup and underwent cardiac cath revealing 3VCAD: 50% oLM lesion, 90-95% pLAD lesion, 90% D1 lesion, 80% pLCx lesion, 90% OM1 lesion, 100% mRCA lesion with L-R collaterals, EF:55%. Cardiothoracic surgery, Dr. Burnett, consulted for surgical evaluation and patient deemed a surgical candidate. On 11/01/2018 patient underwent Coronary Artery Bypass Grafting (LIMA-LAD, SVG-PDA, OM),  EF 55%, OR uncomplicated and patient transferred to CTICU. POD0 extubated, POD1 weaned off Librado, ambulated, and transferred to Mini-ICU on [address] stepdown unit. Of note, upon preop workup patient found to have 70% or greater diameter stenosis of the left internal carotid artery, critical stenosis, and no hemodynamically significant right carotid stenosis. Vascular surgery consulted, and patient will follow up as outpatient. While inpatient, permissive HTN for carotid stenosis, SBP >130s. Endocrine also consulted for A1C 8.3, recs appreciated. Today is POD 4 and patient is tolerating PO diet, moving her bowels and voiding spontaneously. Of note, CXR showed possible b/l pleural effusions, ultrasound chest confirmed less than 200cc on the left and less than 250cc on the right, this was discussed with Dr. Hogan and Dr. Burnett, the patient is net negative, and ambulating multiple times in the halls on room air, patient denies SOB.  Patient is hemodynamically stable and heart rate is controlled and per Dr. Burnett, patient is medically stable for discharge today with outpatient follow ups. Of note, a 7 day supply of prescriptions has been given to the patient prior to discharge and a 30 day supply has been sent to their local pharmacy. Patient understands and agrees with this plan.

## 2018-11-05 NOTE — DISCHARGE NOTE ADULT - MEDICATION SUMMARY - MEDICATIONS TO STOP TAKING
I will STOP taking the medications listed below when I get home from the hospital:    atorvastatin 20 mg oral tablet  -- 1 tab(s) by mouth once a day    metoprolol succinate 50 mg oral tablet, extended release  -- 1 tab(s) by mouth once a day    metoprolol succinate 25 mg oral tablet, extended release  -- 1 tab(s) by mouth once a day I will STOP taking the medications listed below when I get home from the hospital:    metFORMIN 500 mg oral tablet  -- 1 tab(s) by mouth once a day    atorvastatin 20 mg oral tablet  -- 1 tab(s) by mouth once a day    metoprolol succinate 50 mg oral tablet, extended release  -- 1 tab(s) by mouth once a day    metoprolol succinate 25 mg oral tablet, extended release  -- 1 tab(s) by mouth once a day

## 2018-11-05 NOTE — PROGRESS NOTE ADULT - SUBJECTIVE AND OBJECTIVE BOX
INTERVAL HPI/OVERNIGHT EVENTS:    Patient is a 68y old  Female who presents with a chief complaint of chest pain   Pt is now s/p CABG  reports eating well today, had some right sided neck pain,   +BM, no nausea, no vomiting, no urinary issues.   Pt is getting out of bed.     Pt reports the following symptoms:    CONSTITUTIONAL:  Negative fever or chills, feels well, good appetite  EYES:  Negative  blurry vision or double vision  CARDIOVASCULAR:  Negative for chest pain or palpitations  RESPIRATORY:  Negative for cough, wheezing, or SOB   GASTROINTESTINAL:  Negative for nausea, vomiting, diarrhea, constipation, or abdominal pain  GENITOURINARY:  Negative frequency, urgency or dysuria  NEUROLOGIC:  No headache, confusion, dizziness, lightheadedness    MEDICATIONS  (STANDING):  aspirin enteric coated 81 milliGRAM(s) Oral daily  atorvastatin 80 milliGRAM(s) Oral at bedtime  chlorhexidine 0.12% Liquid 5 milliLiter(s) Oral Mucosa every 4 hours  chlorhexidine 2% Cloths 1 Application(s) Topical daily  clopidogrel Tablet 75 milliGRAM(s) Oral daily  dextrose 5%. 1000 milliLiter(s) (50 mL/Hr) IV Continuous <Continuous>  docusate sodium 100 milliGRAM(s) Oral three times a day  famotidine    Tablet 20 milliGRAM(s) Oral daily  furosemide    Tablet 20 milliGRAM(s) Oral daily  heparin  Injectable 5000 Unit(s) SubCutaneous every 8 hours  insulin glargine Injectable (LANTUS) 10 Unit(s) SubCutaneous at bedtime  insulin lispro (HumaLOG) corrective regimen sliding scale   SubCutaneous Before meals and at bedtime  insulin lispro Injectable (HumaLOG) 3 Unit(s) SubCutaneous three times a day before meals  lidocaine   Patch 1 Patch Transdermal daily  metoprolol tartrate 37.5 milliGRAM(s) Oral every 6 hours  pantoprazole    Tablet 40 milliGRAM(s) Oral before breakfast  potassium chloride    Tablet ER 10 milliEquivalent(s) Oral daily  sodium chloride 0.9% lock flush 3 milliLiter(s) IV Push every 8 hours  sodium chloride 0.9%. 1000 milliLiter(s) (10 mL/Hr) IV Continuous <Continuous>    MEDICATIONS  (PRN):  acetaminophen   Tablet .. 650 milliGRAM(s) Oral every 6 hours PRN Mild Pain (1 - 3)  oxyCODONE    5 mG/acetaminophen 325 mG 1 Tablet(s) Oral every 4 hours PRN Moderate Pain (4 - 6)  traMADol 25 milliGRAM(s) Oral every 6 hours PRN Severe Pain (7 - 10)      PHYSICAL EXAM  Vital Signs Last 24 Hrs  T(C): 37 (2018 13:54), Max: 37.6 (2018 17:22)  T(F): 98.6 (2018 13:54), Max: 99.6 (2018 17:22)  HR: 66 (2018 13:22) (66 - 82)  BP: 131/61 (2018 13:22) (114/55 - 166/81)  BP(mean): 80 (2018 08:45) (80 - 130)  RR: 18 (2018 13:22) (16 - 18)  SpO2: 95% (2018 13:22) (92% - 97%)    Constitutional: wn/wd in NAD.   HEENT: NCAT, MMM, OP clear, EOMI, no proptosis or lid retraction  Neck: no thyromegaly or palpable thyroid nodules   Respiratory: lungs CTAB.  Cardiovascular: regular rhythm, normal S1 and S2, no audible murmurs, no peripheral edema  GI: soft, NT/ND, no masses/HSM appreciated.  Neurology: no tremors, DTR 2+  Skin: no visible rashes/lesions  Psychiatric: AAO x 3, normal affect/mood.    LABS:                        9.6    9.0   )-----------( 168      ( 2018 05:29 )             29.6     11-05    136  |  97  |  30<H>  ----------------------------<  114<H>  3.9   |  25  |  1.31<H>    Ca    9.0      2018 05:29  Mg     1.7     11-05        Urinalysis Basic - ( 2018 11:41 )    Color: Yellow / Appearance: Clear / S.010 / pH: x  Gluc: x / Ketone: NEGATIVE  / Bili: Negative / Urobili: 0.2 E.U./dL   Blood: x / Protein: 30 mg/dL / Nitrite: NEGATIVE   Leuk Esterase: NEGATIVE / RBC: 5-10 /HPF / WBC < 5 /HPF   Sq Epi: x / Non Sq Epi: 0-5 /HPF / Bacteria: Present /HPF      Thyroid Stimulating Hormone, Serum: 1.866 uIU/mL (10-30 @ 05:51)      HbA1C: 8.3 % (10-30 @ 05:51)    CAPILLARY BLOOD GLUCOSE      POCT Blood Glucose.: 235 mg/dL (2018 11:32)  POCT Blood Glucose.: 143 mg/dL (2018 06:46)  POCT Blood Glucose.: 211 mg/dL (2018 21:19)  POCT Blood Glucose.: 183 mg/dL (2018 16:46)    Insulin Sliding Scale requirements X 24 Hours:

## 2018-11-05 NOTE — DISCHARGE NOTE ADULT - PATIENT PORTAL LINK FT
You can access the [facility], offered by [facility], by registering with the following website: [email or web address]

## 2018-11-05 NOTE — DISCHARGE NOTE ADULT - MEDICATION SUMMARY - MEDICATIONS TO TAKE
I will START or STAY ON the medications listed below when I get home from the hospital:    acetaminophen 325 mg oral tablet  -- 2 tab(s) by mouth every 6 hours, As needed, Mild Pain (1 - 3)  -- Indication: For Pain    aspirin 81 mg oral delayed release tablet  -- 1 tab(s) by mouth once a day  -- Indication: For Blood thinner    traMADol 50 mg oral tablet  -- 0.5 tab(s) by mouth every 6 hours, As needed, Severe Pain (7 - 10) MDD:Do not exceed more than 2 tabs per day  -- Indication: For Pain    metFORMIN 500 mg oral tablet  -- 1 tab(s) by mouth once a day  -- Indication: For Diabetes    atorvastatin 80 mg oral tablet  -- 1 tab(s) by mouth once a day (at bedtime)  -- Indication: For CHolesterol    clopidogrel 75 mg oral tablet  -- 1 tab(s) by mouth once a day  -- Indication: For Blood thinner    metoprolol tartrate 75 mg oral tablet  -- 1 tab(s) by mouth every 12 hours   -- It is very important that you take or use this exactly as directed.  Do not skip doses or discontinue unless directed by your doctor.  May cause drowsiness.  Alcohol may intensify this effect.  Use care when operating dangerous machinery.  Some non-prescription drugs may aggravate your condition.  Read all labels carefully.  If a warning appears, check with your doctor before taking.  Take with food or milk.  This drug may impair the ability to drive or operate machinery.  Use care until you become familiar with its effects.    -- Indication: For Blood pressure and heart rate    docusate sodium 100 mg oral capsule  -- 1 cap(s) by mouth 3 times a day  -- Indication: For Constipation    pantoprazole 40 mg oral delayed release tablet  -- 1 tab(s) by mouth once a day (before a meal)  -- Indication: For Stomach ulcer prevention I will START or STAY ON the medications listed below when I get home from the hospital:    acetaminophen 325 mg oral tablet  -- 2 tab(s) by mouth every 6 hours, As needed, Mild Pain (1 - 3)  -- Indication: For Pain    aspirin 81 mg oral delayed release tablet  -- 1 tab(s) by mouth once a day  -- Indication: For Blood thinner    Januvia 100 mg oral tablet  -- 1 tab(s) by mouth once a day   -- Do not drink alcoholic beverages when taking this medication.    -- Indication: For Diabetes    Jardiance 10 mg oral tablet  -- 1 tab(s) by mouth once a day   -- Check with your doctor before becoming pregnant.    -- Indication: For Diabetes    metFORMIN 1000 mg oral tablet  -- 1 tab(s) by mouth every 12 hours   -- Check with your doctor before becoming pregnant.  Do not drink alcoholic beverages when taking this medication.  It is very important that you take or use this exactly as directed.  Do not skip doses or discontinue unless directed by your doctor.  Obtain medical advice before taking any non-prescription drugs as some may affect the action of this medication.  Take with food or milk.    -- Indication: For Diabetes    atorvastatin 80 mg oral tablet  -- 1 tab(s) by mouth once a day (at bedtime)  -- Indication: For CHolesterol    clopidogrel 75 mg oral tablet  -- 1 tab(s) by mouth once a day  -- Indication: For Blood thinner    metoprolol tartrate 75 mg oral tablet  -- 1 tab(s) by mouth every 12 hours   -- It is very important that you take or use this exactly as directed.  Do not skip doses or discontinue unless directed by your doctor.  May cause drowsiness.  Alcohol may intensify this effect.  Use care when operating dangerous machinery.  Some non-prescription drugs may aggravate your condition.  Read all labels carefully.  If a warning appears, check with your doctor before taking.  Take with food or milk.  This drug may impair the ability to drive or operate machinery.  Use care until you become familiar with its effects.    -- Indication: For Blood pressure and heart rate    docusate sodium 100 mg oral capsule  -- 1 cap(s) by mouth 3 times a day. HOLD if loose or frequent stools.   -- Indication: For Constipation    pantoprazole 40 mg oral delayed release tablet  -- 1 tab(s) by mouth once a day (before a meal)  -- Indication: For Stomach ulcer prevention I will START or STAY ON the medications listed below when I get home from the hospital:    acetaminophen 325 mg oral tablet  -- 2 tab(s) by mouth every 6 hours, As needed, Mild Pain (1 - 3)  -- Indication: For Pain    aspirin 81 mg oral delayed release tablet  -- 1 tab(s) by mouth once a day  -- Indication: For Blood thinner    glimepiride 2 mg oral tablet  -- 1 tab(s) by mouth every 12 hours   -- Avoid prolonged or excessive exposure to direct and/or artificial sunlight while taking this medication.  Do not drink alcoholic beverages when taking this medication.  It is very important that you take or use this exactly as directed.  Do not skip doses or discontinue unless directed by your doctor.    -- Indication: For Diabetes    metFORMIN 1000 mg oral tablet  -- 1 tab(s) by mouth every 12 hours   -- Check with your doctor before becoming pregnant.  Do not drink alcoholic beverages when taking this medication.  It is very important that you take or use this exactly as directed.  Do not skip doses or discontinue unless directed by your doctor.  Obtain medical advice before taking any non-prescription drugs as some may affect the action of this medication.  Take with food or milk.    -- Indication: For Diabetes    atorvastatin 80 mg oral tablet  -- 1 tab(s) by mouth once a day (at bedtime)  -- Indication: For CHolesterol    clopidogrel 75 mg oral tablet  -- 1 tab(s) by mouth once a day  -- Indication: For Blood thinner    metoprolol tartrate 75 mg oral tablet  -- 1 tab(s) by mouth every 12 hours   -- It is very important that you take or use this exactly as directed.  Do not skip doses or discontinue unless directed by your doctor.  May cause drowsiness.  Alcohol may intensify this effect.  Use care when operating dangerous machinery.  Some non-prescription drugs may aggravate your condition.  Read all labels carefully.  If a warning appears, check with your doctor before taking.  Take with food or milk.  This drug may impair the ability to drive or operate machinery.  Use care until you become familiar with its effects.    -- Indication: For Blood pressure and heart rate    docusate sodium 100 mg oral capsule  -- 1 cap(s) by mouth 3 times a day. HOLD if loose or frequent stools.   -- Indication: For Constipation    pantoprazole 40 mg oral delayed release tablet  -- 1 tab(s) by mouth once a day (before a meal)  -- Indication: For Stomach ulcer prevention

## 2018-11-05 NOTE — DISCHARGE NOTE ADULT - CARE PROVIDER_API CALL
ARIELLE Burnett), Thoracic and Cardiac Surgery  [address]  [address] [place] [address]  Phone: [phone]  Fax: [phone]    Aramis Villaseñor), Vascular Surgery  [address]  [address] [place] [address]  Phone: [phone]  Fax: [phone]

## 2018-11-05 NOTE — PROGRESS NOTE ADULT - SUBJECTIVE AND OBJECTIVE BOX
Pt is doing well post op   CABS  CAD  3 Vessel disease  sittng in chair   using spirometry     PAST MEDICAL & SURGICAL HISTORY:  HLD (hyperlipidemia)  CKD (chronic kidney disease) stage 3, GFR 30-59 ml/min  HTN (hypertension)  DM (diabetes mellitus)  H/O eye surgery  H/O tubal ligation    MEDICATIONS  (PRN):  acetaminophen   Tablet .. 650 milliGRAM(s) Oral every 6 hours PRN Mild Pain (1 - 3)  oxyCODONE    5 mG/acetaminophen 325 mG 1 Tablet(s) Oral every 4 hours PRN Moderate Pain (4 - 6)  traMADol 25 milliGRAM(s) Oral every 6 hours PRN Severe Pain (7 - 10)    MEDICATIONS  (STANDING):  aspirin enteric coated 81 milliGRAM(s) Oral daily  atorvastatin 80 milliGRAM(s) Oral at bedtime  chlorhexidine 0.12% Liquid 5 milliLiter(s) Oral Mucosa every 4 hours  chlorhexidine 2% Cloths 1 Application(s) Topical daily  clopidogrel Tablet 75 milliGRAM(s) Oral daily  dextrose 5%. 1000 milliLiter(s) (50 mL/Hr) IV Continuous <Continuous>  docusate sodium 100 milliGRAM(s) Oral three times a day  famotidine    Tablet 20 milliGRAM(s) Oral daily  furosemide    Tablet 20 milliGRAM(s) Oral daily  heparin  Injectable 5000 Unit(s) SubCutaneous every 8 hours  insulin glargine Injectable (LANTUS) 10 Unit(s) SubCutaneous at bedtime  insulin lispro (HumaLOG) corrective regimen sliding scale   SubCutaneous Before meals and at bedtime  insulin lispro Injectable (HumaLOG) 3 Unit(s) SubCutaneous three times a day before meals  lidocaine   Patch 1 Patch Transdermal daily  magnesium oxide 800 milliGRAM(s) Oral once  metoprolol tartrate 37.5 milliGRAM(s) Oral every 6 hours  pantoprazole    Tablet 40 milliGRAM(s) Oral before breakfast  potassium chloride    Tablet ER 10 milliEquivalent(s) Oral daily  potassium chloride    Tablet ER 20 milliEquivalent(s) Oral once  sodium chloride 0.9% lock flush 3 milliLiter(s) IV Push every 8 hours  sodium chloride 0.9%. 1000 milliLiter(s) (10 mL/Hr) IV Continuous <Continuous>    ICU Vital Signs Last 24 Hrs  T(C): 37.1 (05 Nov 2018 05:24), Max: 37.6 (04 Nov 2018 17:22)  T(F): 98.8 (05 Nov 2018 05:24), Max: 99.6 (04 Nov 2018 17:22)  HR: 70 (05 Nov 2018 08:45) (66 - 82)  BP: 114/55 (05 Nov 2018 08:45) (114/55 - 166/81)  BP(mean): 80 (05 Nov 2018 08:45) (80 - 130)  ABP: --  ABP(mean): --  RR: 17 (05 Nov 2018 08:45) (16 - 18)  SpO2: 95% (05 Nov 2018 08:45) (92% - 97%)      Lungs decreased breath sounds at bases  CXR small bilateral effusions   improve basilar opacities     CV s1 s2  Abd soft  Ext stable                          9.6    9.0   )-----------( 168      ( 05 Nov 2018 05:29 )             29.6   11-05    136  |  97  |  30<H>  ----------------------------<  114<H>  3.9   |  25  |  1.31<H>    Ca    9.0      05 Nov 2018 05:29  Phos  3.0     11-03  Mg     1.7     11-05    PT/INR - ( 03 Nov 2018 10:23 )   PT: 13.6 sec;   INR: 1.20          PTT - ( 03 Nov 2018 10:23 )  PTT:33.7 sec

## 2018-11-05 NOTE — DISCHARGE NOTE ADULT - PLAN OF CARE
To recover from surgery -Please follow up with  ___on ___.  The office is located at [facility], [facility], 4th floor. Call us with any questions #[phone].    -Walk daily as tolerated and use your incentive spirometer every hour.    -No driving or strenuous activity/exercise for 6 weeks, or until cleared by your surgeon.    -Gently clean your incisions with anti-bacterial soap and water, pat dry.  You may leave them open to air.    -Call your doctor if you have shortness of breath, chest pain not relieved by pain medication, dizziness, fever >101.5, or increased redness or drainage from incisions. -Please follow up with Dr. Burnett on ___.  The office is located at [facility], [facility], 4th floor. Call us with any questions #[phone].    -Walk daily as tolerated and use your incentive spirometer every hour.    -No driving or strenuous activity/exercise for 6 weeks, or until cleared by your surgeon.    -Gently clean your incisions with anti-bacterial soap and water, pat dry.  You may leave them open to air.    -Call your doctor if you have shortness of breath, chest pain not relieved by pain medication, dizziness, fever >101.5, or increased redness or drainage from incisions. To follow up with Vascular Surgery Please follow up with Dr. Villaseñor on ___ -Please follow up with Dr. Burnett within 1-2 weeks from the date of discharge. Our office will be calling you tomorrow with the appointment. If for some reason you do not hear from them, please call the office by Wednesday. The office is located at [facility], [facility], 4th floor. Call us with any questions #[phone].    -Walk daily as tolerated and use your incentive spirometer every hour.    -No driving or strenuous activity/exercise for 6 weeks, or until cleared by your surgeon.    -Gently clean your incisions with anti-bacterial soap and water, pat dry.  You may leave them open to air.    -Call your doctor if you have shortness of breath, chest pain not relieved by pain medication, dizziness, fever >101.5, or increased redness or drainage from incisions.  - You are being discharged with a 7-day supply of medications from our pharmacy. However, we were unable to discharge you with a few important medications. One of which is Aspirin. This medication you can  over the counter at your local pharmacy, or at [facility] or [facility]. It is very important you take Aspirin 81mg once a day.     - When you run out of your 7 day supply, please  your prescribed medications at [facility]. A 30-day supply has been sent. It is very important that you continue to take the medications prescribed. If you have any issues, please call our office, again, the number is above. Please follow up with Dr. Villaseñor on the same day you follow up with Dr. Burnett. Once again, our office will be calling you tomorrow with this appointment as well. Dr. Villaseñor is Vascular Surgery, he has been following you because during your pre-surgical workup, you were found to have stenosis of your left internal carotid artery. It is very important that you attend your follow up appointments.

## 2018-11-05 NOTE — DISCHARGE NOTE ADULT - CARE PROVIDERS DIRECT ADDRESSES
[email or web address]

## 2018-11-05 NOTE — DISCHARGE NOTE ADULT - NS AS ACTIVITY OBS
Walking-Outdoors allowed/Walking-Indoors allowed/Showering allowed/Do not make important decisions/Do not drive or operate machinery/No Heavy lifting/straining

## 2018-11-05 NOTE — PROGRESS NOTE ADULT - PROVIDER SPECIALTY LIST ADULT
CCU
CCU
CT Surgery
Cardiology
Critical Care
Endocrinology
Intervent Cardiology
Vascular Surgery
Endocrinology
Endocrinology

## 2018-11-05 NOTE — PROGRESS NOTE ADULT - ATTENDING COMMENTS
1.  DM - type 2, uncontrolled but not severe with macrovascular complications.   increase lantus to 10 units  start pre-meal lispro 3 units TID  continue moderate dose sliding scale with meals and at bedtime.   Please continue consistent carbohydrate diet.      Goal FSG is 100-180  Will continue to monitor   For discharge, pt can continue metformin 1000 mg twice daily, jardiance 10mg once daily, and can start januvia 100mg once daily    Pt can follow up with me at discharge at [address] by calling  to make appointment.
A/P: 68y Female with history of DM type II presenting for management of CAD now s/p CABG with continued prandial hyerpglycemia    1.  DM - type 2, uncontrolled but not severe with macrovascular complications.   - can continue current lantus  - increase lispro to 5 units TID with meals.   continue consistent carbohydrate diet, continue moderate scale at meals and at bedtime.     goal fsg is 100-180  for discharge, pt can continue metformin 1000mg twice daily, januvia 100mg once daily, jardiance 10mg once daily.     Pt can follow up with me at discharge at [address] [place] [address] by calling  to make appointment.
A/P: 68y Female with history of DM type II presenting for chest pain, found to have CAD now s/p CABG POD #1  with good glycemic control    1.  DM - type 2, uncontrolled but not severe with macrovascular complications.   - agree with 10 units NPH this morning.   - can give 8 units lantus tonight.  No pre-meal insulin at this time as pt has poor PO intake and decreased appetite  can continue moderate dose sliding scale with meals and at bedtime.     Please continue consistent carbohydrate diet.      Goal FSG is 100-180  Will continue to monitor     Pt can follow up with me at discharge at [address] by calling  to make appointment.
A/P: 68y Female with history of DM type II presenting for management of CAD now s/p CABG with good glycemic control.     1.  DM - type 2, uncontrolled but not severe, complicated.   - pt with FSG at goal today  continue current regimen.   Please continue consistent carbohydrate diet.      Goal FSG is 100-180  Will continue to monitor     Pt can follow up with me at discharge at [address] by calling  to make appointment.
A/P: 68y Female with history of DM type II presenting for chest pain, found to have significant CAD on cardiac cath, now planned for CABG this admission.     1.  DM - type 2, uncontrolled, exacerbated by home diet.   No need for standing long acting insulin given appropriate AM fasting sugar today and NPO status likely for tomorrow.   Please start 3 units lispro with her dinner tonight.   Please change nitroglycerin infusion to NS (it is now in D5)  Please continue moderate dose sliding scale and consistent carbohydrate diet.   Pt will need intensive glucose control post-operately and will likely require insulin infusion.     Goal FSG is 100-180  Will continue to monitor

## 2018-11-05 NOTE — DISCHARGE NOTE ADULT - CARE PLAN
Principal Discharge DX:	Coronary artery disease  Goal:	To recover from surgery  Assessment and plan of treatment:	-Please follow up with  ___on ___.  The office is located at [facility], [facility], 4th floor. Call us with any questions #[phone].    -Walk daily as tolerated and use your incentive spirometer every hour.    -No driving or strenuous activity/exercise for 6 weeks, or until cleared by your surgeon.    -Gently clean your incisions with anti-bacterial soap and water, pat dry.  You may leave them open to air.    -Call your doctor if you have shortness of breath, chest pain not relieved by pain medication, dizziness, fever >101.5, or increased redness or drainage from incisions.  Secondary Diagnosis:	Carotid stenosis Principal Discharge DX:	Coronary artery disease  Goal:	To recover from surgery  Assessment and plan of treatment:	-Please follow up with Dr. Burnett on ___.  The office is located at [facility], [facility], 4th floor. Call us with any questions #[phone].    -Walk daily as tolerated and use your incentive spirometer every hour.    -No driving or strenuous activity/exercise for 6 weeks, or until cleared by your surgeon.    -Gently clean your incisions with anti-bacterial soap and water, pat dry.  You may leave them open to air.    -Call your doctor if you have shortness of breath, chest pain not relieved by pain medication, dizziness, fever >101.5, or increased redness or drainage from incisions.  Secondary Diagnosis:	Carotid stenosis  Goal:	To follow up with Vascular Surgery  Assessment and plan of treatment:	Please follow up with Dr. Villaseñor on ___ Principal Discharge DX:	Coronary artery disease  Goal:	To recover from surgery  Assessment and plan of treatment:	-Please follow up with Dr. Burnett within 1-2 weeks from the date of discharge. Our office will be calling you tomorrow with the appointment. If for some reason you do not hear from them, please call the office by Wednesday. The office is located at [facility], [facility], 4th floor. Call us with any questions #[phone].    -Walk daily as tolerated and use your incentive spirometer every hour.    -No driving or strenuous activity/exercise for 6 weeks, or until cleared by your surgeon.    -Gently clean your incisions with anti-bacterial soap and water, pat dry.  You may leave them open to air.    -Call your doctor if you have shortness of breath, chest pain not relieved by pain medication, dizziness, fever >101.5, or increased redness or drainage from incisions.  - You are being discharged with a 7-day supply of medications from our pharmacy. However, we were unable to discharge you with a few important medications. One of which is Aspirin. This medication you can  over the counter at your local pharmacy, or at [facility] or [facility]. It is very important you take Aspirin 81mg once a day.     - When you run out of your 7 day supply, please  your prescribed medications at [facility]. A 30-day supply has been sent. It is very important that you continue to take the medications prescribed. If you have any issues, please call our office, again, the number is above.  Secondary Diagnosis:	Carotid stenosis  Goal:	To follow up with Vascular Surgery  Assessment and plan of treatment:	Please follow up with Dr. Villaseñor on the same day you follow up with Dr. Burnett. Once again, our office will be calling you tomorrow with this appointment as well. Dr. Villaseñor is Vascular Surgery, he has been following you because during your pre-surgical workup, you were found to have stenosis of your left internal carotid artery. It is very important that you attend your follow up appointments.

## 2018-11-05 NOTE — PROGRESS NOTE ADULT - ASSESSMENT
-Please follow up with Dr. Burnett within 1-2 weeks from the date of discharge. Our office will be calling you tomorrow with the appointment. If for some reason you do not hear from them, please call the office by Wednesday. The office is located at [facility], [facility], 4th floor. Call us with any questions #[phone].  -Walk daily as tolerated and use your incentive spirometer every hour.  -No driving or strenuous activity/exercise for 6 weeks, or until cleared by your surgeon.  -Gently clean your incisions with anti-bacterial soap and water, pat dry.  You may leave them open to air.  -Call your doctor if you have shortness of breath, chest pain not relieved by pain medication, dizziness, fever >101.5, or increased redness or drainage from incisions.  - You are being discharged with a 7-day supply of medications from our pharmacy. However, we were unable to discharge you with a few important medications. One of which is Aspirin. This medication you can  over the counter at your local pharmacy, or at [facility] or [facility]. It is very important you take Aspirin 81mg once a day.   - When you run out of your 7 day supply, please  your prescribed medications at [facility]. A 30-day supply has been sent. It is very important that you continue to take the medications prescribed. If you have any issues, please call our office, again, the number is above.  ·  Secondary Discharge Dx Carotid stenosis.  ·  Goal: To follow up with Vascular Surgery.  ·  Assessment and Plan of Treatment: Please follow up with Dr. Villaseñor on the same day you follow up with Dr. Burnett. Once again, our office will be calling you tomorrow with this appointment as well. Dr. Villaseñor is Vascular Surgery, he has been following you because during your pre-surgical workup, you were found to have stenosis of your left internal carotid artery. It is very important that you attend your follow up appointments.

## 2018-11-09 ENCOUNTER — APPOINTMENT (OUTPATIENT)
Age: 68
End: 2018-11-09
Payer: SELF-PAY

## 2018-11-09 DIAGNOSIS — Z86.39 PERSONAL HISTORY OF OTHER ENDOCRINE, NUTRITIONAL AND METABOLIC DISEASE: ICD-10-CM

## 2018-11-09 DIAGNOSIS — I25.10 ATHEROSCLEROTIC HEART DISEASE OF NATIVE CORONARY ARTERY W/OUT ANGINA PECTORIS: ICD-10-CM

## 2018-11-09 DIAGNOSIS — Z86.79 PERSONAL HISTORY OF OTHER DISEASES OF THE CIRCULATORY SYSTEM: ICD-10-CM

## 2018-11-09 DIAGNOSIS — Z87.448 PERSONAL HISTORY OF OTHER DISEASES OF URINARY SYSTEM: ICD-10-CM

## 2018-11-09 PROCEDURE — 99024 POSTOP FOLLOW-UP VISIT: CPT

## 2018-11-13 ENCOUNTER — FORM ENCOUNTER (OUTPATIENT)
Age: 68
End: 2018-11-13

## 2018-11-13 VITALS — BODY MASS INDEX: 24.97 KG/M2 | HEIGHT: 61.02 IN | WEIGHT: 132.28 LBS

## 2018-11-13 DIAGNOSIS — Z79.84 LONG TERM (CURRENT) USE OF ORAL HYPOGLYCEMIC DRUGS: ICD-10-CM

## 2018-11-13 DIAGNOSIS — D64.9 ANEMIA, UNSPECIFIED: ICD-10-CM

## 2018-11-13 DIAGNOSIS — E78.5 HYPERLIPIDEMIA, UNSPECIFIED: ICD-10-CM

## 2018-11-13 DIAGNOSIS — I25.2 OLD MYOCARDIAL INFARCTION: ICD-10-CM

## 2018-11-13 DIAGNOSIS — Z91.11 PATIENT'S NONCOMPLIANCE WITH DIETARY REGIMEN: ICD-10-CM

## 2018-11-13 DIAGNOSIS — N18.3 CHRONIC KIDNEY DISEASE, STAGE 3 (MODERATE): ICD-10-CM

## 2018-11-13 DIAGNOSIS — I65.22 OCCLUSION AND STENOSIS OF LEFT CAROTID ARTERY: ICD-10-CM

## 2018-11-13 DIAGNOSIS — E11.22 TYPE 2 DIABETES MELLITUS WITH DIABETIC CHRONIC KIDNEY DISEASE: ICD-10-CM

## 2018-11-13 DIAGNOSIS — E11.42 TYPE 2 DIABETES MELLITUS WITH DIABETIC POLYNEUROPATHY: ICD-10-CM

## 2018-11-13 DIAGNOSIS — I12.9 HYPERTENSIVE CHRONIC KIDNEY DISEASE WITH STAGE 1 THROUGH STAGE 4 CHRONIC KIDNEY DISEASE, OR UNSPECIFIED CHRONIC KIDNEY DISEASE: ICD-10-CM

## 2018-11-13 DIAGNOSIS — I25.119 ATHEROSCLEROTIC HEART DISEASE OF NATIVE CORONARY ARTERY WITH UNSPECIFIED ANGINA PECTORIS: ICD-10-CM

## 2018-11-13 DIAGNOSIS — Z82.49 FAMILY HISTORY OF ISCHEMIC HEART DISEASE AND OTHER DISEASES OF THE CIRCULATORY SYSTEM: ICD-10-CM

## 2018-11-13 DIAGNOSIS — E11.65 TYPE 2 DIABETES MELLITUS WITH HYPERGLYCEMIA: ICD-10-CM

## 2018-11-13 DIAGNOSIS — I25.84 CORONARY ATHEROSCLEROSIS DUE TO CALCIFIED CORONARY LESION: ICD-10-CM

## 2018-11-13 RX ORDER — METOPROLOL TARTRATE 75 MG/1
75 TABLET, FILM COATED ORAL
Qty: 60 | Refills: 3 | Status: ACTIVE | COMMUNITY
Start: 2018-11-13

## 2018-11-13 RX ORDER — CLOPIDOGREL BISULFATE 75 MG/1
75 TABLET, FILM COATED ORAL DAILY
Qty: 1 | Refills: 0 | Status: ACTIVE | COMMUNITY
Start: 2018-11-13

## 2018-11-13 RX ORDER — ATORVASTATIN CALCIUM 80 MG/1
80 TABLET, FILM COATED ORAL
Qty: 1 | Refills: 3 | Status: ACTIVE | COMMUNITY
Start: 2018-11-13

## 2018-11-13 RX ORDER — ASPIRIN ENTERIC COATED TABLETS 81 MG 81 MG/1
81 TABLET, DELAYED RELEASE ORAL DAILY
Qty: 30 | Refills: 3 | Status: ACTIVE | COMMUNITY
Start: 2018-11-13

## 2018-11-13 RX ORDER — PANTOPRAZOLE 40 MG/1
40 TABLET, DELAYED RELEASE ORAL DAILY
Refills: 3 | Status: ACTIVE | COMMUNITY
Start: 2018-11-13

## 2018-11-13 RX ORDER — ACETAMINOPHEN 325 MG/1
325 TABLET ORAL EVERY 6 HOURS
Qty: 60 | Refills: 0 | Status: ACTIVE | COMMUNITY
Start: 2018-11-13

## 2018-11-13 RX ORDER — GLIMEPIRIDE 2 MG/1
2 TABLET ORAL DAILY
Refills: 0 | Status: ACTIVE | COMMUNITY
Start: 2018-11-13

## 2018-11-13 RX ORDER — METFORMIN HYDROCHLORIDE 1000 MG/1
1000 TABLET, COATED ORAL
Qty: 180 | Refills: 3 | Status: ACTIVE | COMMUNITY
Start: 2018-11-13

## 2018-11-14 ENCOUNTER — OUTPATIENT (OUTPATIENT)
Dept: OUTPATIENT SERVICES | Facility: HOSPITAL | Age: 68
LOS: 1 days | End: 2018-11-14
Payer: MEDICAID

## 2018-11-14 ENCOUNTER — APPOINTMENT (OUTPATIENT)
Dept: CARDIOTHORACIC SURGERY | Facility: CLINIC | Age: 68
End: 2018-11-14
Payer: MEDICAID

## 2018-11-14 ENCOUNTER — APPOINTMENT (OUTPATIENT)
Dept: VASCULAR SURGERY | Facility: CLINIC | Age: 68
End: 2018-11-14
Payer: MEDICAID

## 2018-11-14 VITALS
WEIGHT: 130 LBS | DIASTOLIC BLOOD PRESSURE: 67 MMHG | BODY MASS INDEX: 25.52 KG/M2 | OXYGEN SATURATION: 97 % | HEIGHT: 60 IN | RESPIRATION RATE: 18 BRPM | SYSTOLIC BLOOD PRESSURE: 137 MMHG | TEMPERATURE: 97.2 F | HEART RATE: 62 BPM

## 2018-11-14 VITALS — DIASTOLIC BLOOD PRESSURE: 77 MMHG | HEART RATE: 64 BPM | SYSTOLIC BLOOD PRESSURE: 137 MMHG

## 2018-11-14 VITALS
SYSTOLIC BLOOD PRESSURE: 126 MMHG | OXYGEN SATURATION: 96 % | RESPIRATION RATE: 16 BRPM | TEMPERATURE: 97.9 F | DIASTOLIC BLOOD PRESSURE: 78 MMHG | HEART RATE: 74 BPM

## 2018-11-14 DIAGNOSIS — Z98.51 TUBAL LIGATION STATUS: Chronic | ICD-10-CM

## 2018-11-14 DIAGNOSIS — Z98.890 OTHER SPECIFIED POSTPROCEDURAL STATES: Chronic | ICD-10-CM

## 2018-11-14 PROBLEM — Z86.79 HISTORY OF HYPERTENSION: Status: RESOLVED | Noted: 2018-11-14 | Resolved: 2018-11-14

## 2018-11-14 PROBLEM — Z86.39 HISTORY OF TYPE 2 DIABETES MELLITUS: Status: RESOLVED | Noted: 2018-11-14 | Resolved: 2018-11-14

## 2018-11-14 PROBLEM — Z87.448 HISTORY OF CHRONIC KIDNEY DISEASE: Status: RESOLVED | Noted: 2018-11-14 | Resolved: 2018-11-14

## 2018-11-14 PROBLEM — I25.10 3-VESSEL CAD: Status: ACTIVE | Noted: 2018-11-14

## 2018-11-14 PROBLEM — Z86.39 HISTORY OF HYPERLIPIDEMIA: Status: RESOLVED | Noted: 2018-11-14 | Resolved: 2018-11-14

## 2018-11-14 PROBLEM — Z86.79 HISTORY OF CORONARY ARTERY DISEASE: Status: RESOLVED | Noted: 2018-11-14 | Resolved: 2018-11-14

## 2018-11-14 PROCEDURE — 36415 COLL VENOUS BLD VENIPUNCTURE: CPT

## 2018-11-14 PROCEDURE — 86923 COMPATIBILITY TEST ELECTRIC: CPT

## 2018-11-14 PROCEDURE — 96374 THER/PROPH/DIAG INJ IV PUSH: CPT | Mod: XU

## 2018-11-14 PROCEDURE — 85379 FIBRIN DEGRADATION QUANT: CPT

## 2018-11-14 PROCEDURE — 80061 LIPID PANEL: CPT

## 2018-11-14 PROCEDURE — C1769: CPT

## 2018-11-14 PROCEDURE — 84132 ASSAY OF SERUM POTASSIUM: CPT

## 2018-11-14 PROCEDURE — 83690 ASSAY OF LIPASE: CPT

## 2018-11-14 PROCEDURE — 36430 TRANSFUSION BLD/BLD COMPNT: CPT

## 2018-11-14 PROCEDURE — 85610 PROTHROMBIN TIME: CPT

## 2018-11-14 PROCEDURE — 96375 TX/PRO/DX INJ NEW DRUG ADDON: CPT

## 2018-11-14 PROCEDURE — 71046 X-RAY EXAM CHEST 2 VIEWS: CPT

## 2018-11-14 PROCEDURE — 71045 X-RAY EXAM CHEST 1 VIEW: CPT

## 2018-11-14 PROCEDURE — 82962 GLUCOSE BLOOD TEST: CPT

## 2018-11-14 PROCEDURE — 85730 THROMBOPLASTIN TIME PARTIAL: CPT

## 2018-11-14 PROCEDURE — 80076 HEPATIC FUNCTION PANEL: CPT

## 2018-11-14 PROCEDURE — 75574 CT ANGIO HRT W/3D IMAGE: CPT

## 2018-11-14 PROCEDURE — 84484 ASSAY OF TROPONIN QUANT: CPT

## 2018-11-14 PROCEDURE — 71046 X-RAY EXAM CHEST 2 VIEWS: CPT | Mod: 26

## 2018-11-14 PROCEDURE — 99024 POSTOP FOLLOW-UP VISIT: CPT

## 2018-11-14 PROCEDURE — 86900 BLOOD TYPING SEROLOGIC ABO: CPT

## 2018-11-14 PROCEDURE — 82803 BLOOD GASES ANY COMBINATION: CPT

## 2018-11-14 PROCEDURE — 97161 PT EVAL LOW COMPLEX 20 MIN: CPT

## 2018-11-14 PROCEDURE — 82553 CREATINE MB FRACTION: CPT

## 2018-11-14 PROCEDURE — 84443 ASSAY THYROID STIM HORMONE: CPT

## 2018-11-14 PROCEDURE — 83880 ASSAY OF NATRIURETIC PEPTIDE: CPT

## 2018-11-14 PROCEDURE — 83605 ASSAY OF LACTIC ACID: CPT

## 2018-11-14 PROCEDURE — 82550 ASSAY OF CK (CPK): CPT

## 2018-11-14 PROCEDURE — 99213 OFFICE O/P EST LOW 20 MIN: CPT

## 2018-11-14 PROCEDURE — 94150 VITAL CAPACITY TEST: CPT

## 2018-11-14 PROCEDURE — C1889: CPT

## 2018-11-14 PROCEDURE — 84295 ASSAY OF SERUM SODIUM: CPT

## 2018-11-14 PROCEDURE — 97116 GAIT TRAINING THERAPY: CPT

## 2018-11-14 PROCEDURE — 93005 ELECTROCARDIOGRAM TRACING: CPT

## 2018-11-14 PROCEDURE — 83036 HEMOGLOBIN GLYCOSYLATED A1C: CPT

## 2018-11-14 PROCEDURE — 93880 EXTRACRANIAL BILAT STUDY: CPT

## 2018-11-14 PROCEDURE — 80053 COMPREHEN METABOLIC PANEL: CPT

## 2018-11-14 PROCEDURE — P9045: CPT

## 2018-11-14 PROCEDURE — 83735 ASSAY OF MAGNESIUM: CPT

## 2018-11-14 PROCEDURE — C1887: CPT

## 2018-11-14 PROCEDURE — 82330 ASSAY OF CALCIUM: CPT

## 2018-11-14 PROCEDURE — 85027 COMPLETE CBC AUTOMATED: CPT

## 2018-11-14 PROCEDURE — 86850 RBC ANTIBODY SCREEN: CPT

## 2018-11-14 PROCEDURE — 99285 EMERGENCY DEPT VISIT HI MDM: CPT | Mod: 25

## 2018-11-14 PROCEDURE — 85025 COMPLETE CBC W/AUTO DIFF WBC: CPT

## 2018-11-14 PROCEDURE — P9016: CPT

## 2018-11-14 PROCEDURE — 93306 TTE W/DOPPLER COMPLETE: CPT

## 2018-11-14 PROCEDURE — 86901 BLOOD TYPING SEROLOGIC RH(D): CPT

## 2018-11-14 PROCEDURE — 81001 URINALYSIS AUTO W/SCOPE: CPT

## 2018-11-14 PROCEDURE — 80048 BASIC METABOLIC PNL TOTAL CA: CPT

## 2018-11-14 PROCEDURE — 84100 ASSAY OF PHOSPHORUS: CPT

## 2018-11-14 PROCEDURE — C1894: CPT

## 2018-11-15 VITALS
SYSTOLIC BLOOD PRESSURE: 130 MMHG | HEART RATE: 74 BPM | BODY MASS INDEX: 25.52 KG/M2 | DIASTOLIC BLOOD PRESSURE: 80 MMHG | WEIGHT: 130 LBS | HEIGHT: 60 IN | OXYGEN SATURATION: 98 %

## 2018-11-20 ENCOUNTER — APPOINTMENT (OUTPATIENT)
Dept: ENDOCRINOLOGY | Facility: CLINIC | Age: 68
End: 2018-11-20

## 2018-11-27 ENCOUNTER — FORM ENCOUNTER (OUTPATIENT)
Age: 68
End: 2018-11-27

## 2018-11-28 ENCOUNTER — OUTPATIENT (OUTPATIENT)
Dept: OUTPATIENT SERVICES | Facility: HOSPITAL | Age: 68
LOS: 1 days | End: 2018-11-28
Payer: SELF-PAY

## 2018-11-28 ENCOUNTER — APPOINTMENT (OUTPATIENT)
Dept: CARDIOTHORACIC SURGERY | Facility: CLINIC | Age: 68
End: 2018-11-28
Payer: MEDICAID

## 2018-11-28 VITALS
HEIGHT: 60 IN | SYSTOLIC BLOOD PRESSURE: 139 MMHG | TEMPERATURE: 97.5 F | OXYGEN SATURATION: 97 % | HEART RATE: 70 BPM | DIASTOLIC BLOOD PRESSURE: 65 MMHG | WEIGHT: 125 LBS | RESPIRATION RATE: 18 BRPM | BODY MASS INDEX: 24.54 KG/M2

## 2018-11-28 DIAGNOSIS — I50.22 CHRONIC SYSTOLIC (CONGESTIVE) HEART FAILURE: ICD-10-CM

## 2018-11-28 DIAGNOSIS — Z09 ENCOUNTER FOR FOLLOW-UP EXAMINATION AFTER COMPLETED TREATMENT FOR CONDITIONS OTHER THAN MALIGNANT NEOPLASM: ICD-10-CM

## 2018-11-28 DIAGNOSIS — Z98.51 TUBAL LIGATION STATUS: Chronic | ICD-10-CM

## 2018-11-28 DIAGNOSIS — Z98.890 OTHER SPECIFIED POSTPROCEDURAL STATES: Chronic | ICD-10-CM

## 2018-11-28 DIAGNOSIS — Z95.1 PRESENCE OF AORTOCORONARY BYPASS GRAFT: ICD-10-CM

## 2018-11-28 DIAGNOSIS — J90 PLEURAL EFFUSION, NOT ELSEWHERE CLASSIFIED: ICD-10-CM

## 2018-11-28 PROCEDURE — 99024 POSTOP FOLLOW-UP VISIT: CPT

## 2018-11-28 PROCEDURE — 71046 X-RAY EXAM CHEST 2 VIEWS: CPT

## 2018-11-28 PROCEDURE — 71046 X-RAY EXAM CHEST 2 VIEWS: CPT | Mod: 26

## 2018-11-28 RX ORDER — FUROSEMIDE 20 MG/1
20 TABLET ORAL
Qty: 14 | Refills: 0 | Status: COMPLETED | COMMUNITY
Start: 2018-11-14 | End: 2018-11-28

## 2018-11-28 RX ORDER — POTASSIUM CHLORIDE 750 MG/1
10 TABLET, EXTENDED RELEASE ORAL DAILY
Qty: 14 | Refills: 0 | Status: COMPLETED | COMMUNITY
Start: 2018-11-14 | End: 2018-11-28

## 2018-11-28 RX ORDER — DOCUSATE SODIUM 100 MG/1
100 CAPSULE ORAL 3 TIMES DAILY
Qty: 90 | Refills: 0 | Status: COMPLETED | COMMUNITY
Start: 2018-11-13 | End: 2018-11-28

## 2018-12-12 ENCOUNTER — APPOINTMENT (OUTPATIENT)
Dept: VASCULAR SURGERY | Facility: CLINIC | Age: 68
End: 2018-12-12

## 2019-01-23 ENCOUNTER — MESSAGE (OUTPATIENT)
Age: 69
End: 2019-01-23

## 2019-08-01 NOTE — PROCEDURE NOTE - NSSITEPREP_SKIN_A_CORE
chlorhexidine/Adherence to aseptic technique: hand hygiene prior to donning barriers (gown, gloves), don cap and mask, sterile drape over patient sensation is normal and strength is normal.

## 2019-08-09 NOTE — DISCHARGE NOTE ADULT - SECONDARY DIAGNOSIS.
-productive cough developed overnight accompanied by coarse breath sounds; afebrile; tolerating PO intake  -history of COPD  -will start to treat conservatively with scheduled neb treatments and Robafen DM   -VS q shift x 5 days   -continue to monitor for any clinical decline   -DWN
Carotid stenosis

## 2019-08-23 NOTE — PRE-OP CHECKLIST - HEIGHT IN CM
[FreeTextEntry1] : cachectic 89 yo female fell at home several weeks ago with lac right arm treated at ED in [place]\[facility] For F/U
154.94

## 2019-12-28 NOTE — PROGRESS NOTE ADULT - PROBLEM SELECTOR PLAN 6
98.3
- A1C 8.3  - Endo consulted continue FSs and MISS and discharge on metformin 1000mg BID. - As per Dr. Mortensen - no need for Lantus this HS as pt to go for CABG tomorrow and ordered 3 U lispro before dinner x1.

## 2021-04-26 NOTE — ED ADULT TRIAGE NOTE - CCCP TRG CHIEF CMPLNT
chest pain Call primary care provider for follow up after discharge/Activities as tolerated/Low salt diet/Monitor weight daily/Report signs and symptoms to primary care provider

## 2022-02-28 NOTE — ED ADULT TRIAGE NOTE - AS O2 DELIVERY
Medical Necessity Clause: This procedure was medically necessary because the lesions that were treated were: Spray Paint Text: The liquid nitrogen was applied to the skin utilizing a spray paint frosting technique. Spray Paint Technique: No Render Post-Care Instructions In Note?: yes Medical Necessity Information: It is in your best interest to select a reason for this procedure from the list below. All of these items fulfill various CMS LCD requirements except the new and changing color options. Consent: The patient's verbal consent was obtained including but not limited to risks of crusting, scabbing, blistering, scarring, darker or lighter pigmentary change, recurrence, incomplete removal and infection. Post-Care Instructions: I reviewed with the patient in detail post-care instructions. Patient is to wear sunprotection, and avoid picking at any of the treated lesions. Pt may apply Vaseline to crusted or scabbing areas. Detail Level: Detailed room air

## 2025-03-26 NOTE — PATIENT PROFILE ADULT - PRO INTERPRETER NEED 2
Progress Note    Name:  Josephine Holt  :  1959  Age:  65 y.o.  MRN:  [id number]  Date of Evaluation: 25     Hearing aids arrived.  [employer] Intent 2 miniRITE R  R# [id number]  L# [id number]   [id number]  Warranty 28  Inbasketed to schedule HAP with Purnima.    Kannan Lares.  Clinical Audiologist     English

## 2025-06-27 NOTE — PROCEDURE NOTE - NSTIMEOUT_GEN_A_CORE
Yes Patient's first and last name, , procedure, and correct site confirmed prior to the start of procedure.